# Patient Record
Sex: FEMALE | Race: WHITE
[De-identification: names, ages, dates, MRNs, and addresses within clinical notes are randomized per-mention and may not be internally consistent; named-entity substitution may affect disease eponyms.]

---

## 2020-04-16 NOTE — HP
CHIEF COMPLAINT:  Shortness of breath.



HISTORY OF PRESENT ILLNESS:  This patient is a 59-year-old female, who presented to

the emergency department.  The patient has a history of smoking a pack cigarettes

per day.  Says she had a heart attack about a year ago or so and was treated

primarily at [facility].  She was sent home with 4 or 5 medications and an

inhaler, but essentially ran out of money and did not continue on those medications.

 The patient was in her usual state of health until today when she woke feeling

fine, but at some point in the afternoon developed onset of shortness of breath that

became more severe to the point that she had to call an ambulance after several

hours.  On arrival, O2 saturation was 84%.  She was in respiratory distress with

tripoding.  She was given magnesium and Solu-Medrol and started on BiPAP and

transported to the hospital.  The patient denies any fevers or chills.  She denies

any chest pains.  Denies any ill contacts.  Currently, she states she is actually

feeling somewhat better than she did on her initial arrival. 



REVIEW OF SYSTEMS:  All other systems reviewed.  All were negative, except for those

things mentioned in the history of present illness.  Specifically, she denies fever.

 She denies any orthopnea; although, very difficult to get a straight answer on that

as the patient frequently does sit sitting up because she is more comfortable and

when she is lying, she is on her side rather than on her back, but it does not sound

like she has significant orthopnea. 



PAST MEDICAL HISTORY:  She does report having had a heart attack, treated at [facility] about a year ago.  Says she was admitted to this hospital about 3 years

ago, but has no record of that.  She says she has never been admitted for her

breathing per se.  She does report some obesity. 



PAST SURGICAL HISTORY:  None.



FAMILY HISTORY:  There is pervasive obesity within the family.  Father had a brain

tumor. 



SOCIAL HISTORY:  The patient smokes a pack to a pack and half a day and has done so

for 40 years.  She was a heavy alcohol drinker until about 3 months ago.  At that

time, her son moved in with her and he has been monitoring her intakes and now

reportedly has occasional whiskey with soft drinks.  She denies drugs.  She is not

.  She is a full code and her son would be her surrogate decision maker. 



ALLERGIES:  NONE.



MEDICATIONS:  None.



PHYSICAL EXAMINATION:

VITAL SIGNS:  Blood pressure is 130/90, pulse 96, respirations 22, O2 saturation 99%

on BiPAP.  Initial vital signs revealed a blood pressure of 141/13 with a pulse of

126. 

GENERAL APPEARANCE:  Age-appropriate female, in no distress.  She is obese.  She is

sitting up in the bed in the emergency department wearing the BiPAP.  She is

breathing relatively comfortably.  Speaking in largely full sentences. 

HEENT:  ZEINAB.  She has no OP lesions.  Dry oral mucosa. 

NECK:  Supple and symmetric. 

HEART:  Her heart is faintly heard over the sound of the apparatus, but no murmurs,

gallops, or rubs are noted. 

LUNGS:  Notable for scattered rales.  No wheezing.  Generally diminished. 

ABDOMEN:  Obese, soft, nontender, and nondistended.  Positive bowel sounds.  No

masses.  No organomegaly. 

EXTREMITIES:  Reveal chronic stasis dermatitis with 1 to 2+ pitting brawny edema.

Pulses were not palpable. 

PSYCH:  Normal affect and behavior. 

NEURO:  Cranial nerves intact.  Cognition intact.  No evidence of any focal deficits.



LABORATORY DATA:  White count is 22.1, hemoglobin 15.7, platelets 319.  PH 7.17,

pCO2 65.8, and pO2 is 100.3.  Sodium 141, potassium 3.6, chloride 25, CO2 23, BUN

15, creatinine 0.99, glucose 203, lactic acid 3.2, calcium 9.5, magnesium 2.8, total

bilirubin 0.3, AST 44, ALT 27.  Troponin is 0.211.  .6.  Chest x-ray shows

evidence of cardiomegaly and pulmonary edema.  EKG shows some rightward axis

deviation with no ST or T-wave changes. 



IMPRESSION AND PLAN:  

1. Acute respiratory failure with hypoxia and hypercapnia.  The patient will be

maintained on respiratory support, admitted to [facility].  Pulmonary consult. 

2. Congestive heart failure.  The patient has evidence of significant peripheral

edema and pulmonary edema on chest x-ray with substantial cardiomegaly.  The patient

has not been on treatment.  We will continue with diuresis.  Order an

echocardiogram, ACE inhibitor. 

3. Chronic obstructive pulmonary disease exacerbation as manifest by the hypercapnia

and acidosis.  Continue with nebulizer treatments, Solu-Medrol, respiratory support.

 Pulmonary consult and antibiotics with Rocephin. 

4. History of coronary disease per the patient's history.  We will likely need to

obtain records from [facility] in order to determine exactly what the scenario

was with her cardiac condition.  Suspect she was on beta blockers, ACE inhibitors,

aspirin, statin, and Lasix, although cannot confirm that.  We will keep her on

aspirin for now. 

5. Leukocytosis secondary to demargination from Solu-Medrol given in the field.

6. Indeterminate troponin, likely non-ST-elevation myocardial infarction type 2

secondary to respiratory failure and heart failure exacerbation.  We will continue

to trend while keeping her on telemetry. 

7. Lactic acidosis.  The patient has a white count, tachycardia and lactic acidosis.

 The white count is due to the steroids.  The tachycardia was due to some

respiratory failure and the lactic acidosis is likely hypoperfusion from hypoxia.

There is currently no evidence of specific infection to suggest sepsis. 

8. Significant peripheral edema likely secondary to congestive heart failure.







Job ID:  [id number]

## 2020-04-16 NOTE — CON
DATE OF CONSULTATION:  



HISTORY OF PRESENT ILLNESS:  Dee Bravo is a 59-year-old female.  She is a

smoker.  She says she has an inhaler at home, but never has to use it. 



She had the sudden onset of shortness of breath, led to emergent transport to 
the

hospital.  She feels like once she started getting oxygen and nebulizer 
treatments

she improved.  She was transported on BiPAP apparently. 



She says she is feeling 100% better.



PAST MEDICAL HISTORY:  Remarkable for coronary artery disease.



SOCIAL HISTORY:  She is a pack and a half a day smoker.  She has been a daily

drinker, just cut back on her drinking. 



FAMILY HISTORY:  Positive for cancer.



REVIEW OF SYSTEMS:  Otherwise negative.  Specifically, she denies fever, chills,

sweats, being around anyone who is sick. 



PHYSICAL EXAMINATION:

VITAL SIGNS:  Heart rate is 90, blood pressure is 167/87, respiratory rate is 20
,

and oximetry is 97. 

HEENT:  Pupils are equal.  Sclerae are anicteric. 

NECK:  Supple.  No lymphadenopathy. 

LUNGS:  Distant and clear. 

HEART:  Regular rhythm.  S1 and S2 are normal. 

ABDOMEN:  Soft and nontender. 

EXTREMITIES:  Without clubbing, cyanosis, or edema.



LABORATORY DATA:  White count 22.1, hemoglobin 15.7, and platelets 319.  Sodium 
141,

potassium 3.6, chloride 105, bicarb 23, BUN 15, and creatinine 0.99. 



IMPRESSION:  Probable chronic obstructive pulmonary disease exacerbation.  Chest

x-ray did not show any masses or infiltrates.  Rapid improvement and rapid 
onset are

concerning for a coronary ischemic event, especially with her history of ongoing

tobacco in spite of having a heart attack 3 years ago.  Chest x-ray is very

underpenetrated.  It is hard to say whether or not there is a component of 
pulmonary

edema.  I would recommend repeating a chest x-ray in the morning.  The history 
of

expulsive onset is more worrisome for coronary event, but a rapid improvement 
with

nebulized treatments of BiPAP argues in the favor of lung problems.  I will be 
happy

to follow the other physicians caring for Ms. Bravo.  An echocardiogram would 
be

helpful.  Surprisingly, her BNP was 138, but that may not have been dramatically

elevated.  This was related to transient coronary ischemia.  She does appear to 
be a

diabetic.  We will follow.  Check a chest x-ray in the morning. 



This is a 50 min consult with greater than 50% of the time spent on  the unit 
for coordination of care.



Job ID:  [id number]



MTDD

## 2020-04-16 NOTE — RAD
Exam: Chest one view



HISTORY:Dyspnea



Comparison: None



FINDINGS:

Cardiac silhouette:Cardiomegaly

Aorta: Unremarkable

Pulmonary vessels: Normal

Costophrenic angles: Clear



LUNGS: Diffuse interstitial and patchy alveolar opacities.

Pneumothorax: None



Osseous abnormalities: None



IMPRESSION: 

1. Possible congestive heart failure.

2. Superimposed interstitial/alveolar edema or infiltrate cannot be excluded. Continued surveillance 
is warranted.



Reported By: Ishmael Hebert 

Electronically Signed:  4/16/2020 7:43 AM

## 2020-04-17 NOTE — RAD
PORTABLE CHEST:

 

DATE: 4/17/2020.

 

PROVIDED CLINICAL HISTORY:

Dyspnea.

 

FINDINGS: 

Comparison 4/16/2020.  Cardiac silhouette remains enlarged.  Cardiac silhouette remains enlarged.  Pr
ominence of the pulmonary vasculature and pulmonary interstitium persists, improved with respect to p
rior.  No focal consolidation, pleural fluid, or pneumothorax apparent.

 

IMPRESSION: 

Cardiomegaly and improved changes of congestive failure.

 

POS: KYM

## 2020-04-17 NOTE — PRG
DATE OF SERVICE:  04/17/2020



SUBJECTIVE:  Andrés Bravo remains stable.  She is on room air.



OBJECTIVE:  VITAL SIGNS:  Blood pressure 156/64, heart rate 66, respiratory rate 20. 

LUNGS:  Unchanged. 

HEART:  Unchanged. 

ABDOMEN:  Unchanged.



LABORATORY DATA:  No new lab today.  Her troponin peaked to 0.38.



IMPRESSION:  Status post sudden onset of respiratory distress.  Chest x-ray is under

penetrated, was suggestive of pulmonary edema.  She has no history of asthma or COPD

exacerbations in the past.  When she had a heart attack years ago, she said she had

shortness of breath __________, but it came on more gradually. 



She probably needs to have a cardiac workup prior to discharge.  Overall, she

appears to be stable.  She has not had any fever to suggest this is an infectious

process. 







Job ID:  [id number]

## 2020-04-17 NOTE — CON
DATE OF CONSULTATION:  



HISTORY OF PRESENT ILLNESS:  The patient is a 59-year-old woman with a history 
of a myocardial infarction, who presented with acute onset of dyspnea.  The 
patient

states that approximately three years ago she was at [facility] 
and had a myocardial infarction.  She did not apparently undergo an invasive 
evaluation.

The patient unfortunately has not been compliant with followup and has not been 
taking any medications.  The patient was in her usual state of health when she

became acutely dyspneic.  She denied having any chest discomfort.  She 
presented to the emergency room markedly short of breath. 



PAST MEDICAL HISTORY:  myocardial infarction.



PAST SURGICAL HISTORY:  Hemorrhoidectomy.



MEDICATIONS:  None.



SOCIAL HISTORY:  Smokes  1-2  packs per day.



ALLERGIES:  NO KNOWN DRUG ALLERGIES.



REVIEW OF SYSTEMS:  Ten-point system is otherwise unremarkable.



FAMILY HISTORY:  Strong family history of coronary artery disease.



PHYSICAL EXAMINATION:

GENERAL:  Obese woman, in no acute distress. 

VITAL SIGNS:  Blood pressure was 174/92. 

NECK:  Showed no jugular venous distention. 

LUNGS:  Clear to auscultation. 

HEART:  Regular rate and rhythm.  Normal S1 and S2 with a 3/6 systolic murmur. 

ABDOMEN:  Nondistended. 

EXTREMITIES:  Showed trace edema. 

VASCULAR:  Radial pulses are 2+.



LABORATORY RESULTS: Sodium is 137, potassium 3.9, chloride 98, bicarbonate 27,

BUN 18, creatinine 0.74, glucose is 162.  Troponin was 0.377.  White blood

cell count is 12.9, hemoglobin 15.3, hematocrit 40.2, and platelets are 282.  
Her

EKG revealed normal sinus rhythm with Q-waves suggestive of a normal sinus 
rhythm,

right bundle-branch block and Q-waves suggestive of previous anteroseptal 
infarct. 



IMPRESSION:  

1. Dyspnea, possibly anginal equivalent.

2. Non-Q-wave myocardial infarction. 

3. History of myocardial infarction.

4. Hypertension.

5. Obesity.

6. Noncompliance.



PLAN:  This patient presents with a non Q-wave myocardial infarction.  At this 
time, we would highly recommend she proceed with a cardiac catheterization to 
evaluate the

extent of her coronary artery disease.  The patient is unclear whether she 
wishes to proceed.  I have explained the life threatening consequences of her 
continuing

noncompliance.  The patient is highly advised to discontinue smoking.  We will 
start the patient on medical therapy and proceed with invasive evaluation if 
the patient

becomes agreeable. 







Job ID:  [id number]



[facility]

## 2020-04-17 NOTE — PDOC.HOSPP
- Subjective


Encounter Date: 04/17/20


Encounter Time: 10:40


Subjective: 





sob is better


no chest pain or palp


is sitting on bed





- Objective


Vital Signs & Weight: 


 Vital Signs (12 hours)











  Temp Pulse Resp BP Pulse Ox


 


 04/17/20 10:19     178/104 H 


 


 04/17/20 10:18   83   178/104 H 


 


 04/17/20 07:46      100


 


 04/17/20 07:22   66  16   99


 


 04/17/20 07:20  98.4 F    


 


 04/17/20 00:09   90  16   98








 Weight











Weight                         240 lb 4 oz











 Most Recent Monitor Data











Heart Rate from ECG            66


 


NIBP                           178/104


 


NIBP BP-Mean                   128


 


Respiration from ECG           24


 


SpO2                           98














I&O: 


 











 04/16/20 04/17/20 04/18/20





 06:59 06:59 06:59


 


Intake Total 240 500 


 


Output Total 1300 4600 


 


Balance -1060 -4100 











Result Diagrams: 


 04/17/20 07:44





 04/17/20 03:17





Hospitalist ROS





- Medication


Medications: 


Active Medications











Generic Name Dose Route Start Last Admin





  Trade Name April  PRN Reason Stop Dose Admin


 


Albuterol/Ipratropium  3 ml  04/16/20 07:00  04/17/20 07:22





  Duoneb  NEB   3 ml





  [id number] LIDA   Administration





     





     





     





     


 


Aspirin  81 mg  04/16/20 09:00  04/16/20 09:02





  Ecotrin  PO   81 mg





  DAILY LIDA   Administration





     





     





     





     


 


Enoxaparin Sodium  40 mg  04/16/20 09:00  04/16/20 09:01





  Lovenox  SC   40 mg





  0900 LIDA   Administration





     





     





     





     


 


Furosemide  40 mg  04/16/20 06:00  04/17/20 05:48





  Lasix  SLOW IVP   40 mg





  0600,1400 LIDA   Administration





     





     





     





     


 


Hydralazine HCl  25 mg  04/16/20 21:00  04/17/20 10:18





  Apresoline  PO   25 mg





  TID LIDA   Administration





     





     





     





     


 


Ceftriaxone Sodium 1 gm/  100 mls @ 200 mls/hr  04/17/20 02:00  04/17/20 01:24





  Sodium Chloride  IVPB   100 mls





  Q24HR LIDA   Administration





     





     





     





     


 


Lisinopril  10 mg  04/16/20 21:00  04/17/20 10:19





  Zestril  PO   10 mg





  BID LIDA   Administration





     





     





     





     


 


Methylprednisolone Sodium Succinate  40 mg  04/16/20 06:00  04/17/20 05:49





  Solu-Medrol  IVP   40 mg





  Q6HR LIDA   Administration





     





     





     





     


 


Metoprolol Tartrate  25 mg  04/16/20 21:00  04/17/20 10:19





  Lopressor  PO   25 mg





  BID LIDA   Administration





     





     





     





     














- Exam


General Appearance: awake alert


Eye: PERRL, anicteric sclera


ENT: no oropharyngeal lesions, moist mucosa


Neck: supple, no JVD


Heart: RRR, no murmur


Respiratory: no wheezes, no rales, rhonchi


Gastrointestinal: soft, non-tender, non-distended, normal bowel sounds


Extremities: no cyanosis, 1+ LE edema


Neurological: cranial nerve grossly intact, no focal deficits


Psychiatric: normal affect, A&O x 3





Hosp A/P


(1) Acute exacerbation of CHF (congestive heart failure)


Code(s): I50.9 - HEART FAILURE, UNSPECIFIED   Status: Acute   


Qualifiers: 


   Heart failure type: combined systolic and diastolic   Qualified Code(s): 

I50.43 - Acute on chronic combined systolic (congestive) and diastolic (

congestive) heart failure   





(2) Type 2 MI (myocardial infarction)


Code(s): I21.A1 - MYOCARDIAL INFARCTION TYPE 2   Status: Acute   





(3) COPD exacerbation


Code(s): J44.1 - CHRONIC OBSTRUCTIVE PULMONARY DISEASE W (ACUTE) EXACERBATION   

Status: Acute   





(4) Tobacco abuse


Code(s): Z72.0 - TOBACCO USE   Status: Chronic   





(5) Alcohol use


Code(s): Z72.89 - OTHER PROBLEMS RELATED TO LIFESTYLE   Status: Chronic   





(6) Obesity


Code(s): E66.9 - OBESITY, UNSPECIFIED   Status: Chronic   


Qualifiers: 


   Obesity classification: adult class 3 (BMI >= 40)   Body mass index: BMI 45.0

-49.9 





(7) HTN (hypertension)


Code(s): I10 - ESSENTIAL (PRIMARY) HYPERTENSION   Status: Chronic   


Qualifiers: 


   Hypertension type: essential hypertension   Qualified Code(s): I10 - 

Essential (primary) hypertension   





(8) Dyslipidemia


Code(s): E78.5 - HYPERLIPIDEMIA, UNSPECIFIED   Status: Chronic   





- Plan





is on asp, lipitor, lopressor, lisinopril, procardia xl, gentle diuresis.


echo shows ef of 45%, normal wall motion


for likely cath if she agrees


on nebs, steroids, is off bipap


is ambulating in room


hemostable


may tx to telemetry

## 2020-04-18 NOTE — PRG
DATE OF SERVICE:  04/18/2020



SUBJECTIVE:  She is sitting up in a chair.  Has no acute complaints.  Says she may

go home tomorrow. 



OBJECTIVE:  VITAL SIGNS:  Temperature is 97.6, pulse 72, respirations 12, O2

saturation 94% on room air, blood pressure 170/81. 

HEENT:  Unremarkable. 

NECK:  No adenopathy or JVD. 

CHEST:  Clear. 

CARDIAC:  S1, S2.  Regular. 

ABDOMEN:  Soft. 

EXTREMITIES:  No edema.



LABORATORY DATA:  White blood cell count 12, hematocrit 47, platelet count 316.

Sodium 137, potassium 4, BUN 23, creatinine 0.8, glucose 159. 



ASSESSMENT:  

1. Non-Q-wave myocardial infarction.

2. Diastolic heart failure.

3. Systolic heart failure.



PLAN:  Cardiac catheterization is planned.  She seems stable from a Pulmonary

standpoint.  I had reviewed the orders.  She has been switched over to oral

antibiotics.  We will check her again on Monday. 







Job ID:  [id number]

## 2020-04-18 NOTE — EKG
Test Reason : 

Blood Pressure : ***/*** mmHG

Vent. Rate : 119 BPM     Atrial Rate : 119 BPM

   P-R Int : 160 ms          QRS Dur : 102 ms

    QT Int : 338 ms       P-R-T Axes : 025 113 047 degrees

   QTc Int : 475 ms

 

Sinus tachycardia

Possible Left atrial enlargement

Right axis deviation

Incomplete right bundle branch block

Anterior infarct , age undetermined

Abnormal ECG

 

Confirmed by DIALLO NIEVES ([id number]),  ECTOR VEGA ([id number]) on 4/18/2020 9:22:06 AM

 

Referred By:             Confirmed By:DIALLO NIEVES

## 2020-04-18 NOTE — PDOC.HOSPP
- Subjective


Encounter Date: 04/18/20


Encounter Time: 09:15


Subjective: 





no chest pain or sob


is off oxygen


wants to eat solid food and nicotine patch


is ambulating in room





- Objective


Vital Signs & Weight: 


 Vital Signs (12 hours)











  Temp Pulse Resp BP Pulse Ox


 


 04/18/20 09:06  97.7 F  69  16  187/84 H  95


 


 04/18/20 06:48   72  12  


 


 04/18/20 03:39  98 F  66  18  145/72 H  93 L


 


 04/18/20 00:46   66  16   97


 


 04/18/20 00:00  97.8 F  66  20  138/63  95








 Weight











Weight                         235 lb











 Most Recent Monitor Data











Heart Rate from ECG            58


 


NIBP                           155/77


 


NIBP BP-Mean                   103


 


Respiration from ECG           26


 


SpO2                           95














I&O: 


 











 04/17/20 04/18/20 04/19/20





 06:59 06:59 06:59


 


Intake Total 500  


 


Output Total 4600  


 


Balance -4100  











Result Diagrams: 


 04/18/20 04:29





 04/18/20 04:29





Hospitalist ROS





- Medication


Medications: 


Active Medications











Generic Name Dose Route Start Last Admin





  Trade Name Freq  PRN Reason Stop Dose Admin


 


Albuterol/Ipratropium  3 ml  04/16/20 07:00  04/18/20 06:48





  Duoneb  NEB   3 ml





  [id number] LIDA   Administration





     





     





     





     


 


Aspirin  81 mg  04/16/20 09:00  04/18/20 09:30





  Ecotrin  PO   81 mg





  DAILY LIDA   Administration





     





     





     





     


 


Atorvastatin Calcium  40 mg  04/17/20 21:00  04/17/20 22:42





  Lipitor  PO   40 mg





  HS LIDA   Administration





     





     





     





     


 


Cefdinir  300 mg  04/18/20 09:00  04/18/20 09:29





  Omnicef  PO   300 mg





  BID LIDA   Administration





     





     





     





     


 


Clopidogrel Bisulfate  75 mg  04/18/20 09:00  04/18/20 09:29





  Plavix  PO   75 mg





  DAILY LIDA   Administration





     





     





     





     


 


Enoxaparin Sodium  40 mg  04/16/20 09:00  04/18/20 09:30





  Lovenox  SC   40 mg





  0900 LIDA   Administration





     





     





     





     


 


Furosemide  40 mg  04/18/20 09:00  04/18/20 09:35





  Lasix  PO   Not Given





  DAILY LIDA   





     





     





     





     


 


Hydralazine HCl  25 mg  04/16/20 21:00  04/18/20 09:29





  Apresoline  PO   25 mg





  TID LIDA   Administration





     





     





     





     


 


Lisinopril  10 mg  04/16/20 21:00  04/18/20 09:30





  Zestril  PO   10 mg





  BID LIDA   Administration





     





     





     





     


 


Metoprolol Tartrate  25 mg  04/16/20 21:00  04/18/20 09:29





  Lopressor  PO   25 mg





  BID LIDA   Administration





     





     





     





     


 


Prednisone  20 mg  04/18/20 08:00  04/18/20 09:29





  Prednisone  PO   20 mg





  QAM-WM LIDA   Administration





     





     





     





     














- Exam


General Appearance: awake alert


Eye: PERRL, anicteric sclera


ENT: no oropharyngeal lesions, moist mucosa


Neck: supple, no JVD


Heart: RRR, no murmur


Respiratory: no wheezes, no rales, rhonchi


Gastrointestinal: soft, non-tender, non-distended, normal bowel sounds


Extremities: no cyanosis, no edema


Neurological: cranial nerve grossly intact, no focal deficits


Psychiatric: normal affect, A&O x 3





Hosp A/P


(1) CAD (coronary artery disease)


Code(s): I25.10 - ATHSCL HEART DISEASE OF NATIVE CORONARY ARTERY W/O ANG PCTRS 

  Status: Acute   


Qualifiers: 


   Coronary Disease-Associated Artery/Lesion type: native artery   Native vs. 

transplanted heart: native heart 





(2) Acute exacerbation of CHF (congestive heart failure)


Code(s): I50.9 - HEART FAILURE, UNSPECIFIED   Status: Acute   


Qualifiers: 


   Heart failure type: combined systolic and diastolic   Qualified Code(s): 

I50.43 - Acute on chronic combined systolic (congestive) and diastolic (

congestive) heart failure   





(3) COPD exacerbation


Code(s): J44.1 - CHRONIC OBSTRUCTIVE PULMONARY DISEASE W (ACUTE) EXACERBATION   

Status: Acute   





(4) Tobacco abuse


Code(s): Z72.0 - TOBACCO USE   Status: Chronic   





(5) Alcohol use


Code(s): Z72.89 - OTHER PROBLEMS RELATED TO LIFESTYLE   Status: Chronic   





(6) Obesity


Code(s): E66.9 - OBESITY, UNSPECIFIED   Status: Chronic   


Qualifiers: 


   Obesity classification: adult class 3 (BMI >= 40)   Body mass index: BMI 45.0

-49.9 





(7) HTN (hypertension)


Code(s): I10 - ESSENTIAL (PRIMARY) HYPERTENSION   Status: Chronic   


Qualifiers: 


   Hypertension type: essential hypertension   Qualified Code(s): I10 - 

Essential (primary) hypertension   





(8) Dyslipidemia


Code(s): E78.5 - HYPERLIPIDEMIA, UNSPECIFIED   Status: Chronic   





- Plan





is on asp, plavix, lipitor, lopressor, lisinopril, procardia xl, oral lasix.


echo shows ef of 45%, normal wall motion


s/p stent to RCA, await cath report.


on nebs, steroids


is ambulating in room


hemostable


dc plan in am if cleared by specialists.

## 2020-04-19 NOTE — DIS
DATE OF ADMISSION:  04/16/2020



DATE OF DISCHARGE:  04/19/2020



DISCHARGE DISPOSITION:  Home.



PRIMARY DISCHARGE DIAGNOSES:  Acute respiratory failure with hypoxia on arrival,

resolved; chronic obstructive pulmonary disease exacerbation, resolved; coronary

artery disease, status post stent placed to RCA; acute congestive heart failure

exacerbation with systolic and diastolic dysfunction; tobacco abuse; obesity;

alcohol abuse; hypertension; dyslipidemia. 



PROCEDURES DONE DURING HOSPITALIZATION:  Chest x-ray done on the day of admission

showed findings suggestive of CHF with pulmonary vascular congestion.  Echo with 2D

Doppler done showed EF of 45% to 50%, moderately dilated left atrium.  There was

diastolic dysfunction.  Cardiac catheterization, the official report is pending at

present.  The patient has had placement of drug-eluting stent to RCA.  H and H 14

and 45, platelet count 274.  Blood gas done on arrival showed a pH of 7.17, pCO2 of

65, pO2 of 100.  Discharge BUN and creatinine are 22 and 0.7.  Troponin I was

indeterminate, peaking up to 0.38.  . 



INPATIENT CONSULT:  Dr. Eng/Setven for Cardiology, Dr. Marques for Pulmonology.



DISCHARGE MEDICATIONS:  

1. Aspirin 81 mg p.o. daily.

2. Plavix 75 mg p.o. daily.

3. Lipitor 40 mg p.o. at bedtime.

4. Omnicef 300 mg p.o. twice daily for another 3 days.  

5. DuoNeb q.6 hourly p.r.n.

6. Lisinopril 20 mg twice daily.

7. Lopressor 25 mg twice daily.

8. Nicotine transdermal patch 21 mg daily for another 15 days.

9. Prednisone 20 mg daily for another 2 more days and to discontinue after that.



ALLERGIES:  NO KNOWN DRUG ALLERGIES.



DISCHARGE PLAN:  The patient to follow up with her primary care physician in 1 week,

Dr. Marques in 3 to 4 weeks, and Dr. Eng in 2 weeks. 



BRIEF COURSE DURING HOSPITALIZATION:  The patient initially got admitted on the 16th

with shortness of breath.  She was initially saturating 84%.  Her clinical exam and

imaging studies were consistent with CHF and COPD exacerbations.  She had known

history of alcohol and tobacco abuse.  Also, the patient had indeterminate troponin.

 She has had consultation with Dr. Eng for Cardiology.  Cardiac catheterization

was done on the 16th and the patient has had stent placed to RCA.  This was a

drug-eluting stent.  Official cardiac catheterization report is pending on the

[facility].  She was optimized on medications.  Prior to discharge, she is on room air

and saturating well.  She is ambulating and eating well.  She is wanting to go home

today.  She was counseled with regard to staying away from tobacco and alcohol

completely.  She was also counseled with regard to weight loss with her BMI being 47

at present.  She is cleared for discharge by Dr. Eng.  Please note, I have seen

and examined the patient on the day of discharge. 







Job ID:  [id number]

## 2020-04-20 NOTE — EKG
Test Reason : 

Blood Pressure : ***/*** mmHG

Vent. Rate : 056 BPM     Atrial Rate : 056 BPM

   P-R Int : 166 ms          QRS Dur : 098 ms

    QT Int : 574 ms       P-R-T Axes : 034 068 125 degrees

   QTc Int : 553 ms

 

Sinus bradycardia

Possible Left atrial enlargement

Incomplete right bundle branch block

Anterior infarct , age undetermined

T wave abnormality, consider lateral ischemia

Prolonged QT

Abnormal ECG

Confirmed by SHIRA GARCIA ([id number]) on 4/20/2020 11:32:54 AM

 

Referred By:  LEXIE           Confirmed By:SHIRA GARCIA

## 2020-04-20 NOTE — EKG
Test Reason : 

Blood Pressure : ***/*** mmHG

Vent. Rate : 080 BPM     Atrial Rate : 080 BPM

   P-R Int : 164 ms          QRS Dur : 096 ms

    QT Int : 496 ms       P-R-T Axes : 057 085 105 degrees

   QTc Int : 572 ms

 

Normal sinus rhythm

RSR' or QR pattern in V1 suggests right ventricular conduction delay

Prolonged QT

Possible Left atrial enlargement

T wave abnormality, consider anterolateral ischemia

Abnormal ECG

Confirmed by SHIRA GARCIA ([id number]) on 4/20/2020 11:26:53 AM

 

Referred By:             Confirmed By:SHIRA GARCIA

## 2020-06-09 NOTE — CON
DATE OF CONSULTATION:  06/09/2020



PRIMARY CARDIOLOGIST:  Jeff Eng MD



REASON FOR REQUEST:  Congestive heart failure.



HISTORY OF PRESENT ILLNESS:  Ms. Bravo states that she became short of breath

yesterday, fairly abruptly, became progressively more short of breath, came to the

emergency room, found to be in congestive heart failure.  She has been admitted for

further therapy.  The patient has had increasing amounts of lower extremity

swelling. 



The patient did not have chest pain or pressure. 



The patient has received intravenous Lasix, received one dose of full-dose Lovenox

and is feeling better now. 



PAST HISTORY:  The patient was here in April.  She underwent cardiac catheterization

by Dr. Eng and was found to have a stenosis in a small diameter right coronary

artery.  Dr. Harris placed a stent.  It was a drug-coated stent.  It was a small

diameter 2.25 mm Synergy.  The patient did well following that up until the current

episode. 



MEDICATIONS:  She says, "I don't really know what my medicines are.  There is not a

lot of them."  She is taking aspirin she said, but otherwise she cannot tell me her

medicines. 



REVIEW OF SYSTEMS:  CONSTITUTIONAL:  Positive for cough.  Negative for fever. 

VISION:  No changes. 

HEARING:  No changes. 

PULMONARY:  Positive for shortness of breath. 

CARDIAC:  No chest pain. 

GASTROINTESTINAL:  No nausea, vomiting, or diarrhea. 

SKIN:  No rashes. 

EXTREMITIES:  Severe edema.



FAMILY HISTORY:  Noncontributory.



PHYSICAL EXAMINATION:

GENERAL:  This is a pleasant patient, resting comfortably. 

VITAL SIGNS:  Her blood pressure is 119/54, pulse 60. 

LUNGS:  Clear anteriorly.  Posteriorly, she has some rales.  She also has rhonchi. 

CARDIAC:  No significant murmur, rub, or gallop.  Heart sounds are somewhat distant. 

ABDOMEN:  Obese and nontender. 

EXTREMITIES:  Warm and dry.  No clubbing or cyanosis.  There is moderate-to-severe

edema.  The extremities are warm.  It looks like there is probably some cellulitis. 



PERTINENT LABORATORY DATA:  The peak troponin of 0.261, probably demand ischemia.

Type 2 non-ST elevation infarction.  .  Chest x-ray looks like pulmonary

vascular congestion.  EKG showed no acute changes. 



ASSESSMENT:  

1. Underlying coronary artery disease.

2. Congestive heart failure, systolic and diastolic mixed, ejection fraction is 45%

to 50% on previous admission. 

3. The patient is unsure about her medicines.

4. Continues to smoke.  She said "half pack cigarettes per day.".



PLAN:  

1. Re-loaded with Plavix as she is not completely able to tell me __________ in case

she is not actually taking it. 

2. Continue aspirin.

3. Continue Lasix.

4. Potassium.

5. Resume ACE inhibitors.

6. Continue statins.

7. She will likely be in the hospital a couple of nights.

8. COVID test is being done as a precaution.

9. Basic metabolic panel scheduled for tomorrow.







Job ID:  [id number]

## 2020-06-09 NOTE — HP
CHIEF COMPLAINT:  Shortness of breath.



HISTORY OF PRESENT ILLNESS:  Ms. Bravo is a 59-year-old female with a past medical

history of congestive heart failure, COPD, myocardial infarction, cigarette smoker

among others, was brought to the emergency room with shortness of breath and cough

that has been going on for the last few days, worse today.  The patient was in

respiratory distress.  As per EMS, patient was hypertensive with systolic 220, given

nitroglycerin paste and sublingual nitroglycerin.  The patient also was placed on

BiPAP.  The patient has chronic leg swelling and wound on the lateral right shin for

about a week.  She has been putting hydrogen peroxide on it.  There has been

purulent material coming out of it.  In the emergency room, patient was placed on

BiPAP and workup showed elevated BNP.  Chest x-ray shows pulmonary vascular

congestion.  The troponin is elevated at 0.15, but it had been high in the past,

during the last admission back in April was 0.3.  The patient was given aspirin and

given one dose of Lovenox.  Also, WBC count was elevated, but the patient denies

fever.  Septic workup, and IV antibiotics were given in the emergency room.  COVID

swab was done in the emergency room.  The patient is being admitted to hospital for

further management. 



PAST MEDICAL HISTORY:  

1. Myocardial infarction.

2. Chronic obstructive pulmonary disease.

3. Congestive heart failure.



PAST SURGICAL HISTORY:  Reviewed and not pertinent.



SOCIAL HISTORY:  Denies alcohol drinking.  She smokes cigarettes one pack per day.



FAMILY HISTORY:  Reviewed and noncontributory.



HOME MEDICATIONS:  Please see home medication reconciliation form for updated

medications. 



ALLERGIES:  NO KNOWN ALLERGIES.



PHYSICAL EXAMINATION:

GENERAL:  The patient is awake, in moderate respiratory distress, orthopnea, on

BiPAP. 

VITAL SIGNS:  Blood pressure 161/64, respiratory rate is 24, temperature 98.6,

oxygen saturation 99% on CPAP. 

HEAD AND NECK:  Normocephalic. 

CHEST:  Coarse bilateral breath sounds, bilateral crackles. 

HEART:  S1, S2.  Regular. 

ABDOMEN:  Obese, soft.  Bowel sounds present. 

NEUROLOGIC:  Awake, alert, moving extremities. 

EXTREMITIES:  Lower, there is 2+ pitting edema in both extremities.  There is an 8 x

4 shallow ulcerative lesion on the lateral right lower extremity with surrounding

erythema. 

GENITOURINARY:  No suprapubic tenderness.  No flank tenderness. 

PSYCH:  Awake and alert.



LABS:  As mentioned above in the history of present illness.  An EKG shows sinus

rhythm with nonspecific ST changes.  Chest x-ray shows pulmonary vascular

congestion. 



ASSESSMENT:  

1. Acute exacerbation of congestive heart failure, diastolic.

2. Elevated troponin? acute coronary syndrome, the patient's troponin back in April

was 0.3. 

3. Cellulitis of the lower extremity.

4. History of myocardial infarction.

5. Chronic obstructive pulmonary disease.

6. Cigarette smoker.



PLAN:  

1. Admit to IMCU.

2. Continue with CPAP/BiPAP therapy.

3. IV diuresis.

4. IV antibiotic.

5. Serial troponins.

6. Consult Cardiology in a.m. for evaluation and further recommendations.

7. Reconcile home medications.

8. DVT prophylaxis as appropriate.

9. Expected length of stay, 2 midnights or more.







Job ID:  [id number]

## 2020-06-09 NOTE — RAD
XR Chest 1 View Portable



HISTORY: Dyspnea



COMPARISON: 4/17/2020



FINDINGS: The heart is enlarged. No lobar consolidation, pneumothoraces, rachna pulmonary edema or lar
ge effusions are seen. There is mild pulmonary vascular congestion.



Reported By: Lamine Anderson 

Electronically Signed:  6/9/2020 12:04 AM

## 2020-06-10 NOTE — PDOC.HOSPP
- Subjective


Encounter Date: 06/10/20


Encounter Time: 16:30


Subjective: 





Patient seen and examined for CHF. No CP. SOB improving. No fever or chills. No 

new complaints. No overnight events





- Objective


Vital Signs & Weight: 


 Vital Signs (12 hours)











  Pulse BP Pulse Ox


 


 06/10/20 09:35  67  138/68 


 


 06/10/20 08:00    98








 Weight











Weight                         248 lb 1.6 oz











 Most Recent Monitor Data











Heart Rate from ECG            59


 


NIBP                           132/73


 


NIBP BP-Mean                   92


 


Respiration from ECG           19


 


SpO2                           95














I&O: 


 











 06/09/20 06/10/20 06/11/20





 06:59 06:59 06:59


 


Intake Total  2030 


 


Output Total  3350 2600


 


Balance  -1320 -2600











Result Diagrams: 


 06/10/20 03:55





 06/10/20 03:55


Radiology Reviewed by me: Yes (CXR - CHF)


EKG Reviewed by me: Yes (Tele SR)





Hospitalist ROS





- Review of Systems


Respiratory: reports: SOB with excertion.  denies: cough, dry, shortness of 

breath, hemoptysis, pleuritic pain, sputum, wheezing, other


Cardiovascular: reports: edema.  denies: chest pain, palpitations, orthopnea, 

paroxysmal noc. dyspnea, light headedness, other





- Medication


Medications: 


Active Medications











Generic Name Dose Route Start Last Admin





  Trade Name Freq  PRN Reason Stop Dose Admin


 


Atorvastatin Calcium  40 mg  06/09/20 21:00  06/09/20 20:09





  Lipitor  PO   40 mg





  HS LIDA   Administration





     





     





     





     


 


Clopidogrel Bisulfate  75 mg  06/10/20 09:00  06/10/20 09:37





  Plavix  PO   75 mg





  DAILY LIDA   Administration





     





     





     





     


 


Enoxaparin Sodium  40 mg  06/09/20 21:00  06/10/20 09:35





  Lovenox  SC   40 mg





  0900,2100 LIDA   Administration





     





     





     





     


 


Famotidine  20 mg  06/09/20 21:00  06/10/20 09:35





  Pepcid  PO   20 mg





  BID LIDA   Administration





     





     





     





     


 


Furosemide  40 mg  06/09/20 06:00  06/10/20 14:31





  Lasix  SLOW IVP   40 mg





  0600,1400 LIDA   Administration





     





     





     





     


 


Hydrocortisone/Aloe  0 gm  06/09/20 21:00  06/10/20 11:10





  Hydrocortisone 1% Cream  TOP   Not Given





  BID LIDA   





     





     





     





     


 


Metoprolol Tartrate  25 mg  06/09/20 21:00  06/10/20 09:36





  Lopressor  PO   25 mg





  BID LIDA   Administration





     





     





     





     


 


Sodium Chloride  10 ml  06/09/20 21:00  06/10/20 09:37





  Flush - Normal Saline  IVF   10 ml





  Q12HR LIDA   Administration





     





     





     





     


 


Sodium Chloride  10 ml  06/09/20 09:57  06/10/20 09:37





  Flush - Normal Saline  IVF   10 ml





  PRN PRN   Administration





  Saline Flush   





     





     





     














- Exam


General Appearance: NAD (at rest)


Heart: RRR, no gallops, no rubs, normal peripheral pulses


Respiratory: no wheezes, no rales, normal chest expansion, rhonchi


Gastrointestinal: soft, non-tender, non-distended, normal bowel sounds


Extremities: no cyanosis, no clubbing


Extremities - other findings: B/L LE edema, no calf tenderness


Neurological: no new deficit


Psychiatric: normal affect, A&O x 3





Hosp A/P





- Plan


DVT proph w/lovenox





Acute on chronic systolic/diastolic HF exacerbation


CAD with recent RCA stent


RLE Cellulitis


Morbid obesity BMI 50.1


COPD


CKD 2


Hyponatremia





PLAN:


Cont IV Lasix


Add fluid restriction


Cont BLANCO


Cont Metoprolol


Cont Lisinopril


AM labs


Daily weights


Change Doxycycline to PO


Add Omnicef


Counselled on CHF


Cont other meds as above

## 2020-06-11 NOTE — PDOC.HOSPP
- Subjective


Encounter Date: 06/11/20


Encounter Time: 08:00


Subjective: 





Pt seen for followup re:  CHF exacerbation.  Feels better today.





- Objective


Vital Signs & Weight: 


 Vital Signs (12 hours)











  Temp Pulse Resp BP BP Pulse Ox


 


 06/11/20 15:41  98.3 F  55 L  14   126/58 L  93 L


 


 06/11/20 11:25  97.8 F  55 L  18   123/58 L  94 L


 


 06/11/20 08:51     140/72  


 


 06/11/20 07:49  97.7 F  61  14   127/61  96








 Weight











Weight                         245 lb 4.8 oz











 Most Recent Monitor Data











Heart Rate from ECG            58


 


NIBP                           137/68


 


NIBP BP-Mean                   91


 


Respiration from ECG           19


 


SpO2                           95














I&O: 


 











 06/10/20 06/11/20 06/12/20





 06:59 06:59 06:59


 


Intake Total 2030  


 


Output Total 3350 2600 


 


Balance -1320 -2600 











Result Diagrams: 


 06/11/20 03:12





 06/11/20 03:12


Additional Labs: 





Labs and MARs reviewed by me





EKG Reviewed by me: Yes (Tele:  NSR)





Hospitalist ROS





- Review of Systems


Respiratory: reports: SOB with excertion.  denies: cough, shortness of breath, 

pleuritic pain, wheezing


Cardiovascular: reports: orthopnea.  denies: chest pain, palpitations, 

paroxysmal noc. dyspnea, edema, light headedness


Gastrointestinal: denies: nausea, vomiting, abdominal pain, diarrhea, 

constipation, melena, hematochezia


Genitourinary: denies: dysuria, frequency, incontinence, hematuria, retention


Skin: denies: rash, lesions, karla, bruising





- Medication


Medications: 


Active Medications











Generic Name Dose Route Start Last Admin





  Trade Name Freq  PRN Reason Stop Dose Admin


 


Aspirin  81 mg  06/11/20 09:00  06/11/20 08:52





  Aspirin Chewable  PO   81 mg





  DAILY LIDA   Administration





     





     





     





     


 


Atorvastatin Calcium  40 mg  06/09/20 21:00  06/10/20 21:16





  Lipitor  PO   40 mg





  HS LIDA   Administration





     





     





     





     


 


Cefdinir  300 mg  06/10/20 21:00  06/11/20 08:51





  Omnicef  PO   300 mg





  BID LIDA   Administration





     





     





     





     


 


Clopidogrel Bisulfate  75 mg  06/10/20 09:00  06/11/20 08:52





  Plavix  PO   75 mg





  DAILY LIDA   Administration





     





     





     





     


 


Doxycycline Hyclate  100 mg  06/10/20 21:00  06/11/20 08:50





  Vibramycin  PO   100 mg





  BID LIDA   Administration





     





     





     





     


 


Enoxaparin Sodium  40 mg  06/09/20 21:00  06/11/20 08:52





  Lovenox  SC   40 mg





  0900,2100 LIDA   Administration





     





     





     





     


 


Famotidine  20 mg  06/09/20 21:00  06/11/20 08:51





  Pepcid  PO   20 mg





  BID LIDA   Administration





     





     





     





     


 


Furosemide  40 mg  06/09/20 06:00  06/11/20 14:01





  Lasix  SLOW IVP   40 mg





  0600,1400 LIDA   Administration





     





     





     





     


 


Hydrocortisone/Aloe  0 gm  06/09/20 21:00  06/11/20 08:59





  Hydrocortisone 1% Cream  TOP   1 applic





  BID LIDA   Administration





     





     





     





     


 


Lisinopril  10 mg  06/10/20 21:00  06/11/20 08:51





  Zestril  PO   10 mg





  BID LIDA   Administration





     





     





     





     


 


Metoprolol Tartrate  25 mg  06/09/20 21:00  06/11/20 08:51





  Lopressor  PO   25 mg





  BID LIDA   Administration





     





     





     





     


 


Saccharomyces Boulardii  250 mg  06/11/20 09:00  06/11/20 08:51





  Florastor  PO   250 mg





  DAILY LIDA   Administration





     





     





     





     


 


Sodium Chloride  10 ml  06/09/20 21:00  06/11/20 09:00





  Flush - Normal Saline  IVF   10 ml





  Q12HR LIDA   Administration





     





     





     





     


 


Sodium Chloride  10 ml  06/09/20 09:57  06/11/20 14:04





  Flush - Normal Saline  IVF   10 ml





  PRN PRN   Administration





  Saline Flush   





     





     





     














- Exam


General Appearance: awake alert


Eye: anicteric sclera


ENT: no oropharyngeal lesions, moist mucosa


Neck: supple, symmetric, no thyromegaly, no lymphadenopathy


Heart: RRR, no gallops, no rubs, normal peripheral pulses


Respiratory: no wheezes, no ronchi, normal chest expansion, rales


Gastrointestinal: soft, non-tender, non-distended, normal bowel sounds


Extremities: 2+ LE edema


Psychiatric: normal affect, normal behavior





Hosp A/P





- Plan





ASSESSMENT:


Acute on chronic systolic/diastolic HF exacerbation NYHA Class 3


CAD


RLE Cellulitis


Morbid obesity 


COPD


CKD 2


Hyponatremia





PLAN:


Cont IV furosemide


Continue fluid restriction


Continue Plavix and aspirin (recent stent)


HTN controlled


Change Doxycycline to PO


Continue Omnicef

## 2020-06-13 NOTE — DIS
DATE OF ADMISSION:  06/09/2020



DATE OF DISCHARGE:  06/12/2020



PRIMARY CARE PROVIDER:  [facility] in [place].



DISCHARGE DIAGNOSES:  

1. Acute-on-chronic combined systolic and diastolic congestive heart failure, [place]

[place] Heart Association class III. 

2. Hyponatremia.

3. Hypertriglyceridemia.

4. COVID-19 ruled out.

5. Right lower extremity cellulitis.



CONDITION OF PATIENT ON THE DAY OF DISCHARGE:  Stable. 



I assessed Ms. Bravo on the day of discharge.  She denies any chest pain or

shortness of breath.  Vital signs are stable.  S1 and S2 are heard, regular.  Lungs

are clear to auscultation bilaterally. 



DISCHARGE MEDICATIONS:  

1. Lopressor 25 mg 2 times a day.

2. Aspirin 81 mg daily.

3. Lipitor 40 mg at bedtime.

4. Omnicef 300 mg 2 times a day for 1 week.

5. Plavix 75 mg daily.

6. Doxycycline 100 mg 2 times a day for 1 week.

7. Lasix 40 mg daily.

8. Lisinopril dose decreased to 10 mg 2 times a day.

9. Florastor 250 mg daily for 1 week.

10. DuoNeb p.r.n.



CONSULTATIONS DURING THIS HOSPITALIZATION:  Cardiology, Dr. Lemus.



POST-ACUTE CARE FOLLOWUP:  With Cardiology, Dr. Eng on July 2, 2020, at 9:15

a.m., and with primary care provider on June 19, 2020, at 1:15 p.m. 



HOSPITAL COURSE:  Ms. Bravo is a pleasant 59-year-old lady who was admitted to [facility] on June 9, 2020, for congestive heart failure

exacerbation.  Please refer to Dr. Guerrier's history and physical note dated

June 9, 2020, for further details.  She was also found to have cellulitis of the

right lower extremity.  She was admitted to [facility] and treated with BiPAP and

intravenous diuretics.  Cardiology Service was consulted.  She improved clinically

with intravenous diuretics and was switched to oral diuretics.  She continued to

improve and is being discharged home in a stable condition. 



Preliminary blood cultures are negative at the time of this dictation.  She is

advised to follow up with primary care provider for final blood culture results. 



ACTIVITY:  No restrictions.



DIET:  Heart healthy and low-sodium.



DISCHARGE DESTINATION:  Home.



TIME SPENT:  Total amount of time spent coordinating this discharge, 32 minutes.







Job ID:  [id number]

## 2020-06-16 NOTE — PQF
GALLO Reis

[id number]                                                             

[id number]                             

                                   

CLINICAL DOCUMENTATION CLARIFICATION FORM:  POST DISCHARGE



Addendum to original discharge summary date:  __________________________________
____



Late entry note date:  _________________________________________________________
__











DATE:06/16/2020                                                    ATTN:Gallo Jimenez







Please exercise your independent, professional judgment in responding to the 
clarification form. 

Clinical indicators are provided on the bottom of this form for your review





Please check appropriate box(s) to clarify if the following diagnosis has been 
ruled in or ruled out:



Sepsis

[  ] Ruled in diagnosis

     [  ] Continue to treat        [  ] Resolved

[x  ] Ruled out diagnosis

[  ] Cannot rule out diagnosis

[  ] Other diagnosis ___________

[  ] Unable to determine





For continuity of documentation, please document condition throughout progress 
notes and discharge summary.  Thank You.



CLINICAL INDICATORS - SIGNS / SYMPTOMS / LABS

ED Notes 06/08 "Sepsis"

HP 06/09 "patient has chronic leg swelling and wound on the lateral right shin"

Vital Signs Temp: 06/09=98.7 06/10=98.4

Vital Signs Pulse: 06/10=67 06/11=61

Vital Signs BP: 06/1161=940/58 06/1214=874/56

Vital Signs Respi: 06/11=18 06/12=16

Collected 06/09 "Blood culture:no growth"

Labs WBC: 06/08=17.6 06/09=11.9

Labs Lactate: 06/08=1.6



RISK FACTORS

Old MI-ED Notes 06/08

COPD-ED Notes 06/08

CHF-ED Notes 06/08

Smoker-ED Notes 06/08

Right leg cellulitis-ED Notes 06/08

CAD-Consult 06/09

Morbid obesity-PN 06/10

CKD stage 2-PN 06/10



TREATMENTS

Sepsis workup initiated-HP 06/09

Rocephin 2gm IV-MAR 06/09

Azithromycin 500mg IV-MAR 06/09

Blood culture-Collected 06/09









(This form is maintained as a part of the permanent medical record)

2015 [employer]  All Rights Reserved

[email or web address]    [phone]
[phone]

                                                              



MTDD

## 2020-06-16 NOTE — PQF
JACKY Reis

[id number]                                                             

[id number]                             

                                   

CLINICAL DOCUMENTATION CLARIFICATION FORM:  POST DISCHARGE



Addendum to original discharge summary date:  __________________________________
____



Late entry note date:  _________________________________________________________
__











DATE: 06/16/2020                                                   ATTN:Jacky Jimenez







Please exercise your independent, professional judgment in responding to the 
clarification form. 

Clinical indicators are provided on the bottom of this form for your review





Please check appropriate box(s) to clarify if the following diagnosis has been 
ruled in or ruled out:



NSTEMI

[  ] Ruled in diagnosis

     [  ] Continue to treat        [  ] Resolved

[ x ] Ruled out diagnosis

[  ] Cannot rule out diagnosis

[  ] Other diagnosis ___________

[  ] Unable to determine





For continuity of documentation, please document condition throughout progress 
notes and discharge summary.  Thank You.



CLINICAL INDICATORS - SIGNS / SYMPTOMS / LABS

ED Notes 06/08 "NSTEMI"

Vital Signs Temp: 06/09=98.7 06/10=98.4

Vital Signs Pulse: 06/10=67 06/11=61

Vital Signs BP: 06/1143=039/58 06/1205=847/56

Vital Signs Respi: 06/11=18 06/12=16

Labs Troponin: 06/08=0.150 06/09=0.261 06/10=0.199

HP 06/09 " nonspecific ST changes"

HP 06/09 " elevated troponin? acute coronary syndrome"



RISK FACTORS

Old MI-ED Notes 06/08

COPD-ED Notes 06/08

CHF-ED Notes 06/08

Smoker-ED Notes 06/08

Right leg cellulitis-ED Notes 06/08

CAD-Consult 06/09

Morbid obesity-PN 06/10

CKD stage 2-PN 06/10



TREATMENTS

EKG-HP 06/09

Aspirin 81mg Oral-MAR 06/09

Plavix 300mg Oral-MAR 06/09









(This form is maintained as a part of the permanent medical record)

2015 [employer].  All Rights Reserved

[email or web address]    [phone]
[phone]

                                                              



 

WILLOW

## 2020-09-15 NOTE — HP
REASON FOR ADMISSION:  Acute onset of shortness of breath/respiratory failure.



HISTORY OF PRESENT ILLNESS:  This is a 60-year-old female patient who was short of

breath.  EMS was called.  Upon their arrival, she appeared to be severely hypoxic.

She was intubated in the field, brought to the emergency room, continued to have

diminished oxygenation.  Her ET tube was exchanged.  Chest x-ray was done.  She was

diagnosed with right-sided pneumothorax.  A chest tube was inserted.  After the

chest tube insertion, she became hypotensive, but responded to IV fluids.  Her pulse

ox initially in the 90s, improved after ET tube exchange.  Currently, she is in the

intensive care unit.  Her pulse ox is 97%.  She appears to be comfortable.  She

opens her eyes and responds appropriately. 



I did review her records and the patient was recently admitted to our hospital in

June for acute on chronic combined systolic and diastolic congestive heart failure.

She required to be in IMCU on BiPAP.  She did receive IV diuresis and was

stabilized.  In April, she was admitted for COPD exacerbation. 



PAST MEDICAL HISTORY:  

1. COPD.

2. Congestive heart failure, combined diastolic and systolic.

3. Morbid obesity.

4. CAD with history of MI.



SOCIAL HISTORY:  From records, she is a smoker.



FAMILY HISTORY:  From records, positive for obesity and brain tumor.



REVIEW OF SYSTEMS:  Unable to obtain.  She is intubated.



PHYSICAL EXAMINATION:

GENERAL:  She is intubated. 

VITAL SIGNS:  Her blood pressure is 125/75, her heart rate is 56, saturating 98%,

and temperature is 98.5. 

HEENT:  Head is nontraumatic and normocephalic.  Pupils equal and reactive.

Extraocular movements are intact.  Nonicteric sclerae.  Well injected conjunctivae.

Oral mucosa normal.  Nasal mucosa normal. 

NECK:  Supple.  No adenopathy.  No murmur.  Thyroid is not palpable.  Trachea is

midline.  No supraclavicular adenopathy. 

HEART:  S1 and S2, regular.  No murmurs.  No gallops.  No friction rubs.  No

displacement of PMI. 

LUNGS:  Diffuse and expiratory wheezing bilaterally. 

ABDOMEN:  Bowel sounds positive.  Nontender abdomen. 

EXTREMITIES:  1+ pitting edema in right lower extremities. 

NEUROLOGIC:  Unable to fully assess.  She is ventilated, but she opens her eyes and

she moves all four extremities. 



LABORATORY DATA:  Blood work shows WBC of 18.4, hemoglobin of 15.1, neutrophil count

83.6%.  INR 1.1.  ABG shows of pH 7.1, pCO2 of 88.1, saturating 87.7%.  Sodium 138,

potassium 4.4, bicarb 23, creatinine of 0.97, glucose 213.  Troponin is 0.129,

previous to that 0.068.  Urinalysis shows 11 to 20 wbc's.  Chest x-ray initially

showed right pneumothorax.  The last chest x-ray showed re-expansion of the lung,

possible infiltrate, possible fluid overload. 



ASSESSMENT AND PLAN:  This is a 60-year-old female patient presenting with acute

onset of shortness of breath, hypoxia secondary to possibly development of right

pneumothorax, complicating factors, possible congestive heart failure exacerbation,

and chronic obstructive pulmonary disease exacerbation in a morbidly obese patient. 



Pulmonary.  The patient is intubated.  We will continue with neb treatments and IV

Solu-Medrol.  She possibly has a pneumonia.  We will continue with IV antibiotics. 



Cardiac.  The patient has abnormal troponin, continue cycling her cardiac enzymes.

She will be on IV Lasix for possible component of congestive heart failure

exacerbation. 



For deep venous thrombosis prophylaxis, she will remain on Lovenox. 



One hour of critical care time was spent to manage the patient.







Job ID:  [id number]

## 2020-09-15 NOTE — CON
DATE OF CONSULTATION:  09/15/2020



TIME SPENT:  45 minutes of critical care time.



REASON FOR CONSULTATION:  Respiratory failure requiring mechanical ventilation, COPD

exacerbation, pneumothorax. 



HISTORY OF PRESENT ILLNESS:  The patient is a 60-year-old female, who presented to

the hospital via EMS intubated. She also had a tension pneumothorax on the right.

My guess is she was probably overly aggressively bagged on the way and developed

pneumothorax. A chest tube was placed. It was not completely put into the chest

cavity as the proximal port was still in the chest wall.  She is on mechanical

ventilation. 



PAST MEDICAL HISTORY:  

1. Chronic obstructive pulmonary disease.

2. Diastolic congestive heart failure.

3. Morbid obesity.

4. Coronary artery disease.

5. Myocardial infarction.



PAST SURGICAL HISTORY:  Unremarkable.



SOCIAL HISTORY:  A pack and a half a day smoker and has drank heavily in the past.



FAMILY MEDICAL HISTORY:  Remarkable for cancer.



REVIEW OF SYSTEMS:  Cannot be obtained as she is currently intubated and sedated.



MEDICATIONS:  Unknown.



PHYSICAL EXAMINATION:

VITAL SIGNS:  Temperature 98.5, pulse 67, blood pressure 135/74, O2 saturation 94%. 

GENERAL: She is a morbidly obese female, standing 5 feet 8 inches, weighing 277

pounds with BMI 42. 

HEENT: Remarkable for intubated patient, cushingoid type appearance in the face. 

NECK:  No adenopathy or JVD. 

LUNGS:  Poor air movement bilaterally. 

CARDIOVASCULAR: S1, S2.  Regular. 

ABDOMEN:  Obese, soft, nontender. 

EXTREMITIES:  Obese.  No cyanosis or edema.



LABORATORY AND DIAGNOSTIC DATA:  Her chest x-ray shows a chest tube, that is not

completely placed into the pleural space.  ABG; pH 7.37, pCO2 of 35, pO2 of 258,

that was on SIMV rate 30, inspiratory pressure set to 1:1 ratio, PEEP of 10, FiO2 of

90%.  White blood cell count 18.4, hematocrit 46.4, and platelet count 256.  Sodium

138, potassium 4.4, chloride 105, CO2 of 23, BUN 12, creatinine 0.9, glucose 213.

Chest x-ray initially showed a large right-sided pneumothorax.  COVID test was

negative. 



ASSESSMENT:  

1. Chronic obstructive pulmonary disease with exacerbation.

2. Acute respiratory failure requiring mechanical ventilation.

3. Iatrogenic right pneumothorax, likely from over aggressively bagging.



PLAN:  

1. I have advanced her chest tube and will recheck her chest x-ray.

2. I switched over to volume ventilation with settings to promote a longer

exhalation time. 

3. Nebulization treatments, steroids.

4. Empiric antibiotics.

5. Enoxaparin for DVT prophylaxis.

6. Protonix for GI prophylaxis.

7. Keep paralyzed today.







Job ID:  [id number]

## 2020-09-15 NOTE — RAD
EXAM: Single view of the chest



HISTORY:   Chest tube placement for pneumothorax



COMPARISON: 9/15/2020 2:23 AM



FINDINGS: Single view of the chest shows an enlarged but stable cardiomediastinal silhouette.  The en
dotracheal tube extends slightly down the right mainstem bronchus. An NG tube is seen in the

stomach. There is been interval placement of a right-sided chest tube with evacuation of the right pn
eumothorax. The chest tube is folded back on itself.  There appears to be a moderate left pleural

effusion. No acute osseous abnormality.



IMPRESSION: 

1. Status post chest tube placement with evacuation of pneumothorax

2. Left pleural effusion

3. Low-lying endotracheal tube was withdrawn on a subsequent chest radiograph.



Reported By: Contreras Fitch 

Electronically Signed:  9/15/2020 7:37 AM

## 2020-09-15 NOTE — PDOC.HOSPP
- Subjective


Encounter Date: 09/15/20


Encounter Time: 10:43


Subjective: 





Ms. Bravo was seen today in follow-up of respiratory failure. She is 

currently supported with the mechanical ventilator.





- Objective


Vital Signs & Weight: 


 Vital Signs (12 hours)











  Temp Pulse Resp Pulse Ox


 


 09/15/20 10:08   60  


 


 09/15/20 08:00    26 H 


 


 09/15/20 07:50   61  


 


 09/15/20 07:44   56 L  30 H  100


 


 09/15/20 07:30   69  


 


 09/15/20 06:21     100


 


 09/15/20 06:00  98.5 F   30 H 


 


 09/15/20 05:21   58 L  








 Weight











Weight                         277 lb 8.992 oz











 Most Recent Monitor Data











Heart Rate from ECG            59


 


NIBP                           107/67


 


NIBP BP-Mean                   80


 


Respiration from ECG           26


 


SpO2                           95














I&O: 


 











 09/14/20 09/15/20 09/16/20





 06:59 06:59 06:59


 


Output Total  95 1150


 


Balance  -95 -1150











Result Diagrams: 


 09/15/20 02:30





 09/15/20 03:13





Hospitalist ROS





- Medication


Medications: 


Active Medications











Generic Name Dose Route Start Last Admin





  Trade Name Freq  PRN Reason Stop Dose Admin


 


Albuterol/Ipratropium  3 ml  09/15/20 07:03  09/15/20 07:44





  Duoneb  NEB   3 ml





  [id number] PRN   Administration





  SOB &/or Wheezing   





     





     





     


 


Albuterol/Ipratropium  3 ml  09/15/20 10:30  09/15/20 10:19





  Duoneb  NEB   3 ml





  [id number] LIDA   Administration





     





     





     





     


 


Aspirin  325 mg  09/15/20 09:00  09/15/20 08:35





  Aspirin  PER TUBE   325 mg





  DAILY LIDA   Administration





     





     





     





     


 


Enoxaparin Sodium  40 mg  09/15/20 09:00  09/15/20 08:35





  Lovenox  SC   40 mg





  0900 LIDA   Administration





     





     





     





     


 


Furosemide  40 mg  09/15/20 09:00  09/15/20 08:35





  Lasix  SLOW IVP   40 mg





  DAILY LIDA   Administration





     





     





     





     


 


Sodium Chloride  1,000 mls @ 70 mls/hr  09/15/20 08:15  09/15/20 08:39





  Normal Saline 0.9%  IV   1,000 mls





  .[id number] LIDA   Administration





     





     





     





     


 


Lorazepam  2 mg  09/15/20 05:39  09/15/20 08:03





  Ativan  SLOW IVP  10/15/20 05:39  2 mg





  Q1H PRN   Administration





  Breakthrough agitation   





     





     





     


 


Methylprednisolone Sodium Succinate  40 mg  09/15/20 09:00  09/15/20 08:35





  Solu-Medrol  IVP   40 mg





  Q6H LIDA   Administration





     





     





     





     


 


Pantoprazole Sodium  40 mg  09/15/20 09:00  09/15/20 08:35





  Protonix  IVP   40 mg





  DAILY LIDA   Administration





     





     





     





     


 


Propofol  1,000 mg  09/15/20 05:39  09/15/20 08:37





  Diprivan  IV  10/15/20 05:39  1,000 mg





  INF PRN   Administration





  TO ACHIEVE GOAL RASS   





     





  Protocol   





     














- Exam


Eye: PERRL, anicteric sclera


Respiratory: rhonchi (+ bilateral rhonchi)


Gastrointestinal: soft, non-tender, non-distended, normal bowel sounds, no 

palpable masses


Extremities: no cyanosis, 2+ LE edema (+ bilateral lower extremity edema)





Hosp A/P


(1) Acute respiratory failure with hypoxia and hypercapnia


Code(s): J96.01 - ACUTE RESPIRATORY FAILURE WITH HYPOXIA; J96.02 - ACUTE 

RESPIRATORY FAILURE WITH HYPERCAPNIA   Status: Acute   





(2) Morbid obesity with BMI of 40.0-44.9, adult


Code(s): E66.01 - MORBID (SEVERE) OBESITY DUE TO EXCESS CALORIES; Z68.41 - BODY 

MASS INDEX (BMI) 40.0-44.9, ADULT   Status: Chronic   





(3) Acute exacerbation of CHF (congestive heart failure)


Code(s): I50.9 - HEART FAILURE, UNSPECIFIED   Status: Acute   


Qualifiers: 


   Heart failure type: combined systolic and diastolic   Qualified Code(s): 

I50.43 - Acute on chronic combined systolic (congestive) and diastolic (

congestive) heart failure   





(4) COPD exacerbation


Code(s): J44.1 - CHRONIC OBSTRUCTIVE PULMONARY DISEASE W (ACUTE) EXACERBATION   

Status: Acute   





(5) HTN (hypertension)


Code(s): I10 - ESSENTIAL (PRIMARY) HYPERTENSION   Status: Chronic   


Qualifiers: 


   Hypertension type: essential hypertension   Qualified Code(s): I10 - 

Essential (primary) hypertension   





- Plan





* Acute respiratory failure due to COPD exacerbation and CHF exacerbation


* Continue vent support


* Continue IV antibiotics and IV steroids, Duonebs


* Continue IV Lasix


* GI and DVT Prophylaxis


* Consider Tube feeds


* Further recommendations from [facility]

## 2020-09-15 NOTE — RAD
CHEST 1 VIEW:

 

HISTORY: 

Post intubation.

 

TIME: 2:23 A.M.

DATE: 9/15/2020.

 

COMPARISON: 

6/8/2020.

 

FINDINGS: 

Very large right-sided pneumothorax with collapse of the right lung and shift of the heart and medias
tinum to the left, evidence for significant tension.  The NG tube tip appears to extend to near the l
evel of the hemidiaphragms.  Endotracheal tube appears to be in the right mainstem bronchus orifice.

 

IMPRESSION: 

Large right-sided tension pneumothorax.

 

 

CHEST 1 VIEW:

 

TIME: 3:02 A.M.

DATE: 9/15/2020.

 

FINDINGS: 

A right chest tube has been placed which extends to near the midline.  It is somewhat kinked upon its
elf and the tip then extends back towards the mid right lung.  There has been considerable improvemen
t in the large tension pneumothorax.  Endotracheal tube tip appears to be in the right mainstem bronc
hus.  NG tube in adequate position.  Patchy pleural and parenchymal changes in the left chest with so
me patchy parenchymal changes in the left mid lung zone laterally and blunting of the left costophren
ic angle.

 

IMPRESSION: 

Chest tube placed on the right with marked improvement in the right-sided pneumothorax.  The chest tu
be is kinked upon itself and repositioning would be advisable.  Endotracheal tube is in the orifice o
f the right mainstem bronchus.

 

POS: OFF

## 2020-09-15 NOTE — RAD
Exam: Chest one view



HISTORY:Cyanotic and unresponsive on seen.



Comparison: 9/15/2020 at 3:10 AM



FINDINGS:

Lines and tubes: Endotracheal tube, nasogastric tube, right-sided chest tube are redemonstrated. Righ
t-sided chest tube has been pulled back. Sidehole is now in the right chest wall. Interval

placement of a left-sided subclavian vascular catheter. Distal tip appears to terminate in the right 
atrium. Cardiac silhouette:Cardiomegaly.

Aorta: Unremarkable

Pulmonary vessels: Normal

Costophrenic angles: Clear



LUNGS: Stable scattered interstitial and alveolar opacities predominantly in the left lung.

Pneumothorax: None



Osseous abnormalities: None



IMPRESSION: No

1. Lines and tubes as above. Consider advancement of right-sided chest tube given that the sidehole i
s in the right chest wall.

2. Persistent opacification of the lung parenchyma.

3. Results of study conveyed to patient's nurse Autumn 9/15/2020 at 7:29 AM

Code CR



Reported By: Ishmael Hebert 

Electronically Signed:  9/15/2020 7:36 AM

## 2020-09-15 NOTE — RAD
CHEST 1 VIEW:

 

HISTORY: 

Repositioning chest tubes.

 

FINDINGS: 

NG tube, endotracheal tube, and right subclavian catheters are in place.  The right chest tube appear
s to be advanced somewhat from the prior study and is slightly bent on itself distally.  Small residu
al right-sided apical pneumothorax.  Patchy parenchymal changes in the right lung as well as some min
imal confluent parenchymal changes in the left lung with some left pleural effusion.

 

IMPRESSION: 

Small residual right-sided pneumothorax.  The right chest tube has been advanced somewhat and is slig
htly bent upon itself.  Nasogastric tube, endotracheal tube, and right subclavian catheters are in pl
ace.  Minimal pleural and parenchymal opacity changes in the left chest.  Continue short-term followu
p.

 

POS: OFF

## 2020-09-16 NOTE — RAD
EXAM: 

CHEST ONE VIEW



HISTORY:

Pneumonia. Right-sided chest U.



COMPARISON:

9/15/2020



FINDINGS:

Endotracheal tube, nasogastric tube, and right-sided vascular catheter remain in place. Cardiac silho
uette remains enlarged. Right-sided thoracostomy tube is again seen with tip now overlying right

lung base. Right-sided pneumothorax is again seen with the pneumothorax has increased in size compare
d to prior study with pneumothorax seen at the right lung apex as well as at the right lung base.

Increased density is seen in the visualized right lung likely related to volume loss. There is nodula
r appearing opacity in the right hilar region which could be related to volume loss and

accentuation of vascular structures in this region. There is improved aeration in the left upper lung
 zone. Subcutaneous emphysema is again seen about the right chest which has increased.



IMPRESSION:



1. Right-sided thoracostomy tube again noted in place which does appear to have been advanced with ti
p now overlying the right lung base. Right-sided pneumothorax persists and is larger in size on

current exam.

2. Remaining lines and tubes are stable in position.

3. Volume loss involving the right lung with nodular appearing opacity in the right hilar region. Thi
s nodular opacity may represent volume loss as well. However, continued follow-up is recommended..

4. Improved aeration left upper lung. 

5. Above findings discussed with Анна CCU nurse on 9/16/2020 at 0759 hours.



Reported By: Abe Patel 

Electronically Signed:  9/16/2020 8:01 AM

## 2020-09-16 NOTE — PRG
DATE OF SERVICE:  09/16/2020



This is 35 minutes critical care time.



SUBJECTIVE:  The patient remains intubated on mechanical ventilation.  There has

been no acute changes overnight.  She has not required paralysis. 



OBJECTIVE:  VITAL SIGNS:  Her temperature is 99.0, pulse 91, blood pressure 135/61.

She is currently on SIMV, rate 22, tidal volume of 350, PEEP 5, pressure support 10,

FiO2 60%. 

HEENT:  Unremarkable. 

NECK:  No adenopathy 

or JVD. 

LUNGS:  Diffuse wheezing bilaterally. 

CARDIAC:  S1, S2 regular. ABDOMEN:  Midline abdominal hernia noted. 

EXTREMITIES:  No edema.



LABORATORY DATA:  Sodium 137, potassium 4.3, chloride 104, CO2 of 26, BUN 15,

creatinine 0.7, glucose 161.  White count 4.3, hemoglobin 13, hematocrit 40, and

platelet count 230. 



ASSESSMENT:  

1. Chronic obstructive pulmonary disease with exacerbation.

2. Acute respiratory failure requiring mechanical ventilation.



PLAN:  

1. Not weanable at this time.  Await ABG from today and make adjustments based on

that. 

2. Continue steroids, antibiotics, and nebulization treatments.

3. Discontinue Levophed order.







Job ID:  [id number]

## 2020-09-16 NOTE — PDOC.HOSPP
- Subjective


Encounter Date: 09/16/20


Encounter Time: 09:45


Subjective: 


Ms. Bravo was seen today in follow-up of respiratory failure. She is 

intubated. She will respond to commands. 





- Objective


Vital Signs & Weight: 


                             Vital Signs (12 hours)











  Temp Pulse Resp BP


 


 09/16/20 02:02   62   122/47 L


 


 09/16/20 02:00    22 H 


 


 09/16/20 00:01   63   119/54 L


 


 09/16/20 00:00  99.4 F   22 H 


 


 09/15/20 22:00    22 H 








                                     Weight











Admit Weight                   277 lb


 


Weight                         277 lb 8.992 oz











                            Most Recent Monitor Data











Heart Rate from ECG            65


 


NIBP                           122/47


 


NIBP BP-Mean                   72


 


Respiration from ECG           12


 


SpO2                           92














I&O: 


                                        











 09/15/20 09/16/20 09/17/20





 06:59 06:59 06:59


 


Intake Total  2407 


 


Output Total 95 2365 


 


Balance -95 42 











Result Diagrams: 


                                 09/15/20 02:30





                                 09/16/20 03:30


Additional Labs: 


                                   Accuchecks











  09/15/20





  15:37


 


POC Glucose  154 H














Hospitalist ROS





- Medication


Medications: 


Active Medications











Generic Name Dose Route Start Last Admin





  Trade Name Freq  PRN Reason Stop Dose Admin


 


Albuterol/Ipratropium  3 ml  09/15/20 07:03  09/15/20 12:44





  Duoneb  NEB   3 ml





  [id number] PRN   Administration





  SOB &/or Wheezing  


 


Albuterol/Ipratropium  3 ml  09/15/20 13:00  09/16/20 02:02





  Duoneb  NEB   3 ml





  [id number] LIDA   Administration


 


Aspirin  325 mg  09/15/20 09:00  09/16/20 09:27





  Aspirin  PER TUBE   325 mg





  DAILY LIDA   Administration


 


Enoxaparin Sodium  40 mg  09/15/20 09:00  09/16/20 09:28





  Lovenox  SC   40 mg





  0900 LIDA   Administration


 


Furosemide  40 mg  09/15/20 09:00  09/16/20 09:28





  Lasix  SLOW IVP   40 mg





  DAILY LIDA   Administration


 


Fentanyl Citrate 2,000 mcg/  100 mls @ 0 mls/hr  09/15/20 05:39  09/16/20 02:19





  Sodium Chloride  IV  10/15/20 05:39  100 mls





  INF LIDA   Administration





  Protocol  





  Per Protocol  


 


Azithromycin 500 mg/ Sodium  250 mls @ 250 mls/hr  09/16/20 09:00  09/16/20 

09:28





  Chloride  IVPB   250 mls





  Q24HR LIDA   Administration


 


Sodium Chloride  1,000 mls @ 70 mls/hr  09/15/20 08:15  09/16/20 00:19





  Normal Saline 0.9%  IV   1,000 mls





  .[id number] LIDA   Administration


 


Lorazepam  2 mg  09/15/20 05:39  09/15/20 15:15





  Ativan  SLOW IVP  10/15/20 05:39  2 mg





  Q1H PRN   Administration





  Breakthrough agitation  


 


Methylprednisolone Sodium Succinate  40 mg  09/15/20 09:00  09/16/20 02:09





  Solu-Medrol  IVP   40 mg





  Q6H LIDA   Administration


 


Pantoprazole Sodium  40 mg  09/15/20 09:00  09/15/20 08:35





  Protonix  IVP   40 mg





  DAILY LIDA   Administration


 


Propofol  1,000 mg  09/15/20 05:39  09/16/20 09:25





  Diprivan  IV  10/15/20 05:39  1,000 mg





  INF PRN   Administration





  TO ACHIEVE GOAL RASS  





  Protocol  


 


Vecuronium Bromide  10 mg  09/15/20 07:42  09/15/20 17:08





  Norcuron  IVP   10 mg





  Q30MIN PRN   Administration





  Agitation  














- Exam


Eye: PERRL, anicteric sclera


Heart: RRR, no murmur, no gallops, no rubs, normal peripheral pulses


Respiratory: rales


Gastrointestinal: soft, non-tender, non-distended, normal bowel sounds, no 

palpable masses, no hepatomegaly, no splenomegaly


Extremities: no cyanosis, 1+ LE edema





Hosp A/P


(1) Acute respiratory failure with hypoxia and hypercapnia


Code(s): J96.01 - ACUTE RESPIRATORY FAILURE WITH HYPOXIA; J96.02 - ACUTE 

RESPIRATORY FAILURE WITH HYPERCAPNIA   Status: Acute   





(2) Morbid obesity with BMI of 40.0-44.9, adult


Code(s): E66.01 - MORBID (SEVERE) OBESITY DUE TO EXCESS CALORIES; Z68.41 - BODY 

MASS INDEX (BMI) 40.0-44.9, ADULT   Status: Chronic   





(3) Acute exacerbation of CHF (congestive heart failure)


Code(s): I50.9 - HEART FAILURE, UNSPECIFIED   Status: Acute   


Qualifiers: 


   Heart failure type: combined systolic and diastolic   Qualified Code(s): 

I50.43 - Acute on chronic combined systolic (congestive) and diastolic 

(congestive) heart failure   





(4) COPD exacerbation


Code(s): J44.1 - CHRONIC OBSTRUCTIVE PULMONARY DISEASE W (ACUTE) EXACERBATION   

Status: Acute   





(5) HTN (hypertension)


Code(s): I10 - ESSENTIAL (PRIMARY) HYPERTENSION   Status: Chronic   


Qualifiers: 


   Hypertension type: essential hypertension   Qualified Code(s): I10 - 

Essential (primary) hypertension   





- Plan





* Acute respiratory failure due to COPD exacerbation and CHF exacerbation


* Continue vent support


* Continue Rocephin, Azithromycin and Solumedrol IV


* Continue IV Lasix


* Blood glucose is stable


* GI and DVT Prophylaxis


* Start tube feeds


* Further recommendations from PCCM

## 2020-09-17 NOTE — PRG
DATE OF SERVICE:  09/17/2020



TIME SPENT:  35 minutes of critical care time.



SUBJECTIVE:  The patient remains intubated on mechanical ventilation.  She will wake

up, follow commands without constraint. 



OBJECTIVE:  VITAL SIGNS:  Temperature is 98.4, pulse 66, blood pressure 139/58, O2

saturation 95%.  Total intake for 24 hours 3046, output 1762. 

HEENT:  Unremarkable. 

NECK:  No JVD. 

LUNGS:  Some mild expiratory wheezing.  Plateau pressure of 17, peak pressure of 37.

 CARDIAC:  S1 and S2.  Regular. 

ABDOMEN:  Soft and nontender. 

EXTREMITIES:  No edema.



LABORATORY DATA:  Sodium 137, potassium 4.3, chloride 104, CO2 of 26, BUN 15,

creatinine 0.7, glucose 161; pH 7.35, pCO2 of 48, pO2 of 117.  Chest x-ray shows a

right apical pneumothorax.  Chest tube did not show any leak today. 



ASSESSMENT:  

1. Right-sided pneumothorax, probably from aggressive bagging during CPR.

2. Acute respiratory failure requiring mechanical ventilation.

3. Chronic obstructive pulmonary disease with exacerbation.



PLAN:  

1. Begin to decrease ventilatory support as tolerated.  Still has some bronchospasm,

so I will assume it is going to be 1 to 2 more days on mechanical ventilation. 

2. Can stop the azithromycin.

3. Stop IV fluids.

4. Order labs for tomorrow.





Job ID:  [id number]

## 2020-09-17 NOTE — RAD
PORTABLE CHEST:

 

Date:  09/17/2020

 

PROVIDED CLINICAL HISTORY:   

Pneumonia. 

 

FINDINGS:

 

Comparison with 09/16/2020.  

 

Support apparatus appears stable in position. There is a stable right pneumothorax. There is no shift
 of mediastinal contents. The left lung base is poorly evaluated on the basis of cardiomegaly with ba
silar pleural and/or parenchymal opacity possible. The appearance of the lung parenchyma is overall n
ot significantly changed with respect to prior. Cardiac silhouette remains enlarged. 

 

IMPRESSION: 

Stable radiographic appearance of the chest. 

 

 

POS: OFF

## 2020-09-18 NOTE — PRG
DATE OF SERVICE:  09/18/2020



A 35-minutes critical care time.



SUBJECTIVE:  The patient remains intubated on mechanical ventilation.  There have

been no acute changes overnight. 



OBJECTIVE:  VITAL SIGNS:  Temperature 98.7, pulse 68, blood pressure 168/67, and sat

94%. 

HEENT:  Unremarkable. 

NECK:  No adenopathy or JVD. 

LUNGS:  No wheezing. 

CARDIAC:  S1 and S2, regular. 

ABDOMEN:  Soft. 

EXTREMITIES:  No edema. 



Her chest x-ray showed no pneumothorax.  Her chest tube showed no air leak.



LABORATORY DATA:  White blood cell count 8.6, hematocrit 42, and platelet count 214.

 PH 7.32, pCO2 of 63, and PO2 of 95.  Sodium 140, potassium 4, chloride 102, CO2 of

30, BUN 26, creatinine 0.6, and glucose 223. 



ASSESSMENT:  

1. Acute respiratory failure requiring mechanical ventilation.

2. Right pneumothorax-iatrogenic from over aggressive bagging.

3. Chronic obstructive pulmonary disease exacerbation.



PLAN:  Spontaneous breathing trial and consider extubating if she passes.  Continue

steroids, nebulization treatments, but change the nebs to q.4 hours. 







Job ID:  [id number]

## 2020-09-18 NOTE — RAD
PORTABLE CHEST:

 

HISTORY: 

Pneumonia followup.

 

COMPARISON: 

9/17/2020.

 

FINDINGS: 

ET tube and NG tube are in place.  There are bibasilar infiltrates and probable small effusions.  Haz
y right upper lung infiltrates in the perihilar regions.  No evidence of significant change from yest
erday.

 

The right chest tube is again seen.  No significant pneumothorax identified.  Subcutaneous emphysema 
again noted.

 

IMPRESSION: 

No evidence of significant interval change.

 

POS: OFF

## 2020-09-18 NOTE — PDOC.HOSPP
- Subjective


Encounter Date: 09/18/20


Encounter Time: 11:00


Subjective: 


Patient seen for follow-up regarding acute hypoxic respiratory failure.  Patient

is intubated, unable to obtain ROS.





- Objective


Vital Signs & Weight: 


                             Vital Signs (12 hours)











  Temp Pulse Resp Pulse Ox


 


 09/18/20 10:34   81  14  94 L


 


 09/18/20 09:20   79  20  94 L


 


 09/18/20 08:00  98.6 F   6 L  96


 


 09/18/20 07:05   85  


 


 09/18/20 06:00    18 


 


 09/18/20 05:04   68  18  97


 


 09/18/20 04:00  98.7 F   18 


 


 09/18/20 02:59   63  18  95


 


 09/18/20 02:37   68  








                                     Weight











Admit Weight                   277 lb


 


Weight                         271 lb 13.279 oz











                            Most Recent Monitor Data











Heart Rate from ECG            68


 


NIBP                           187/86


 


NIBP BP-Mean                   119


 


Respiration from ECG           24


 


SpO2                           94














I&O: 


                                        











 09/17/20 09/18/20 09/19/20





 06:59 06:59 06:59


 


Intake Total 3046.7 4174 214.4


 


Output Total 1762 3125 315


 


Balance 1284.7 1049 -100.6











Result Diagrams: 


                                 09/18/20 03:25





                                 09/18/20 03:25


Additional Labs: 


I reviewed patient's labs and MAR


EKG Reviewed by me: Yes (Telemetry: NSR)





Hospitalist ROS





- Review of Systems


ROS unobtainable: due to endotracheal tube





- Medication


Medications: 


Active Medications











Generic Name Dose Route Start Last Admin





  Trade Name Freq  PRN Reason Stop Dose Admin


 


Albuterol/Ipratropium  3 ml  09/15/20 07:03  09/15/20 12:44





  Duoneb  NEB   3 ml





  [id number] PRN   Administration





  SOB &/or Wheezing  


 


Albuterol/Ipratropium  3 ml  09/18/20 10:30  09/18/20 10:34





  Ipratropium/Albuterol Sulfate 3 Ml Neb  NEB   3 ml





  [id number] LIDA   Administration


 


Aspirin  325 mg  09/15/20 09:00  09/18/20 08:57





  Aspirin  PER TUBE   325 mg





  DAILY LIDA   Administration


 


Enoxaparin Sodium  40 mg  09/15/20 09:00  09/18/20 08:57





  Lovenox  SC   40 mg





  0900 LIDA   Administration


 


Fentanyl Citrate 2,000 mcg/  100 mls @ 0 mls/hr  09/15/20 05:39  09/18/20 06:34





  Sodium Chloride  IV  10/15/20 05:39  100 mls





  INF LIDA   Administration





  Protocol  





  Per Protocol  


 


Ceftriaxone Sodium 1 gm/  100 mls @ 200 mls/hr  09/16/20 09:00  09/18/20 09:01





  Sodium Chloride  IVPB   100 mls





  Q24HR LIDA   Administration


 


Lorazepam  2 mg  09/15/20 05:39  09/16/20 09:49





  Ativan  SLOW IVP  10/15/20 05:39  2 mg





  Q1H PRN   Administration





  Breakthrough agitation  


 


Methylprednisolone Sodium Succinate  40 mg  09/15/20 09:00  09/18/20 09:00





  Solu-Medrol  IVP   40 mg





  Q6H LIDA   Administration


 


Montelukast Sodium  10 mg  09/17/20 09:00  09/18/20 08:57





  Montelukast Sodium 10 Mg Tablet  PER TUBE   10 mg





  DAILY LIDA   Administration


 


Pantoprazole Sodium  40 mg  09/18/20 09:00  09/18/20 08:57





  Pantoprazole 40 Mg Granules Packet  PER TUBE   40 mg





  DAILY LIDA   Administration


 


Propofol  1,000 mg  09/15/20 05:39  09/18/20 05:11





  Diprivan  IV  10/15/20 05:39  1,000 mg





  INF PRN   Administration





  TO ACHIEVE GOAL RASS  





  Protocol  














- Exam


General - other findings: Morbid obesity


Eye: anicteric sclera


ENT: moist mucosa


Neck: supple


Heart: RRR


Respiratory: CTAB


Gastrointestinal: soft


Skin: no rashes


Psychiatric - other findings: Unable to assess





Hosp A/P





- Plan





(1) Acute respiratory failure with hypoxia and hypercapnia


Code(s): J96.01 - ACUTE RESPIRATORY FAILURE WITH HYPOXIA; J96.02 - ACUTE 

RESPIRATORY FAILURE WITH HYPERCAPNIA   Status: Acute   





(2) COPD exacerbation


Code(s): J44.1 - CHRONIC OBSTRUCTIVE PULMONARY DISEASE W (ACUTE) EXACERBATION   

Status: Acute   





(3) Pneumothorax


Code(s): I50.9 - HEART FAILURE, UNSPECIFIED   Status: Acute   


Qualifiers: 


   Status: acute





(4) Morbid obesity with BMI of 40.0-44.9, adult


Code(s): E66.01 - MORBID (SEVERE) OBESITY DUE TO EXCESS CALORIES; Z68.41 - BODY 

MASS INDEX (BMI) 40.0-44.9, ADULT   Status: Chronic   








(5) HTN (hypertension)


Code(s): I10 - ESSENTIAL (PRIMARY) HYPERTENSION   Status: Chronic   


Qualifiers: 


   Hypertension type: essential hypertension   Qualified Code(s): I10 - 

Essential (primary) hypertension   





- Plan





* Patient is still intubated, mechanically ventilated in CCU.


* Continue Rocephin, Azithromycin and Solumedrol IV


* Lasix has been discontinued


* Propofol for sedation


* GI and DVT Prophylaxis


* Patient is on tube feeds


* Iatrogenic pneumothorax improving

## 2020-09-19 NOTE — EKG
Test Reason : 

Blood Pressure : ***/*** mmHG

Vent. Rate : 064 BPM     Atrial Rate : 064 BPM

   P-R Int : 168 ms          QRS Dur : 102 ms

    QT Int : 488 ms       P-R-T Axes : 052 100 052 degrees

   QTc Int : 503 ms

 

*** Poor data quality, interpretation may be adversely affected

Normal sinus rhythm

Rightward axis

Possible Anterior infarct , age undetermined

Prolonged QT

Abnormal ECG

 

Confirmed by DIALLO NIEVES ([id number]),  ECTOR VEGA ([id number]) on 9/19/2020 2:06:00 PM

 

Referred By:             Confirmed By:DIALLO NIEVES

## 2020-09-19 NOTE — RAD
EXAM:

CHEST ONE VIEW:

9/19/20

 

HISTORY: 

Pneumonia. 

 

COMPARISON: 

9/18/20.

 

FINDINGS: 

The previously noted right chest tube has been removed without significant pneumothorax. There is edmond
e persistent subcutaneous emphysema. Poor inspiratory effort with increased bronchovascular markings 
bilaterally more so in the bases with probable small pleural effusions and cardiomegaly. The congesti
on and pleural effusions appear more marked than on the most recent 9/18/20 study. 

 

IMPRESSION: 

Progressive vascular congestion with minimal interstitial and linear worsening opacities in the perih
ilar regions and small bilateral pleural effusions with cardiomegaly. The NG tube and endotracheal tu
bes have been removed. Slightly less inspiration which may account for the somewhat more prominent in
creased markings. 

 

POS: OFF

## 2020-09-19 NOTE — PDOC.HOSPP
- Subjective


Encounter Date: 09/19/20


Encounter Time: 13:19


Subjective: 


Patient seen for follow-up regarding respiratory failure.  She was extubated 

earlier today.  Sitting in bed, reports feeling better.





- Objective


Vital Signs & Weight: 


                             Vital Signs (12 hours)











  Temp Pulse Resp BP Pulse Ox


 


 09/19/20 10:29   80  16  


 


 09/19/20 10:25   68   208/79 H 


 


 09/19/20 07:19   81  14  


 


 09/19/20 06:54   70   203/93 H 


 


 09/19/20 04:00  98.3 F    


 


 09/19/20 02:22   66  16   96








                                     Weight











Admit Weight                   277 lb


 


Weight                         259 lb 7.745 oz











                            Most Recent Monitor Data











Heart Rate from ECG            66


 


NIBP                           210/88


 


NIBP BP-Mean                   128


 


Respiration from ECG           21


 


SpO2                           93














I&O: 


                                        











 09/18/20 09/19/20 09/20/20





 06:59 06:59 06:59


 


Intake Total 4174 1066.4 


 


Output Total 3125 1517 


 


Balance 1049 -450.6 











Result Diagrams: 


                                 09/19/20 04:00





                                 09/19/20 04:00


Additional Labs: 


I reviewed patient's labs and MAR


EKG Reviewed by me: Yes (Telemetry: Normal sinus rhythm)





Hospitalist ROS





- Review of Systems


Respiratory: reports: cough, dry, SOB with excertion.  denies: shortness of 

breath, hemoptysis, pleuritic pain, sputum, wheezing


Cardiovascular: denies: chest pain, palpitations, orthopnea, paroxysmal noc. 

dyspnea, edema, light headedness





- Medication


Medications: 


Active Medications











Generic Name Dose Route Start Last Admin





  Trade Name Freq  PRN Reason Stop Dose Admin


 


Albuterol/Ipratropium  3 ml  09/15/20 07:03  09/15/20 12:44





  Duoneb  NEB   3 ml





  [id number] PRN   Administration





  SOB &/or Wheezing  


 


Albuterol/Ipratropium  3 ml  09/18/20 10:30  09/19/20 10:29





  Ipratropium/Albuterol Sulfate 3 Ml Neb  NEB   3 ml





  [id number] LIDA   Administration


 


Aspirin  325 mg  09/15/20 09:00  09/19/20 10:10





  Aspirin  PER TUBE   325 mg





  DAILY LIDA   Administration


 


Enoxaparin Sodium  40 mg  09/15/20 09:00  09/19/20 10:11





  Lovenox  SC   40 mg





  0900 LIDA   Administration


 


Ceftriaxone Sodium 1 gm/  100 mls @ 200 mls/hr  09/16/20 09:00  09/19/20 10:09





  Sodium Chloride  IVPB   100 mls





  Q24HR LIDA   Administration


 


Labetalol HCl  10 mg  09/19/20 08:19  09/19/20 10:25





  Labetalol Hcl 100 Mg/20 Ml Vial  SLOW IVP   10 mg





  Q4H PRN   Administration





  SBP Greater Than 180  


 


Methylprednisolone Sodium Succinate  40 mg  09/15/20 09:00  09/19/20 10:11





  Solu-Medrol  IVP   40 mg





  Q6H LIDA   Administration


 


Montelukast Sodium  10 mg  09/17/20 09:00  09/19/20 10:10





  Montelukast Sodium 10 Mg Tablet  PER TUBE   10 mg





  DAILY LIDA   Administration


 


Pantoprazole Sodium  40 mg  09/18/20 09:00  09/19/20 10:11





  Pantoprazole 40 Mg Granules Packet  PER TUBE   40 mg





  DAILY LIDA   Administration














- Exam


General - other findings: Obese


Eye: anicteric sclera


ENT: moist mucosa


Neck: supple


Heart: RRR


Respiratory: wheezes


Gastrointestinal: soft, non-tender


Extremities: no cyanosis


Psychiatric: normal affect, normal behavior





Hosp A/P





- Plan








-Assessment





(1) Acute respiratory failure with hypoxia and hypercapnia


Code(s): J96.01 - ACUTE RESPIRATORY FAILURE WITH HYPOXIA; J96.02 - ACUTE 

RESPIRATORY FAILURE WITH HYPERCAPNIA   Status: Acute   





(2) COPD exacerbation


Code(s): J44.1 - CHRONIC OBSTRUCTIVE PULMONARY DISEASE W (ACUTE) EXACERBATION   

Status: Acute   





(3) Pneumothorax


Code(s): I50.9 - HEART FAILURE, UNSPECIFIED   Status: Acute   


Qualifiers: 


   Status: acute





(4) hypertensive urgency


Status: Acute   

















- Plan





* Patient extubated earlier today, clinically improving.


* She is on Rocephin, Azithromycin and Solumedrol IV, will continue for now


* GI and DVT Prophylaxis


* Patient is on tube feeds


* Iatrogenic pneumothorax improving


* Patient's blood pressures were significantly elevated.  We will start her on 

  hydralazine 25 mg 3 times daily and amlodipine 5 mg daily.  Will give one-time

  dose of hydralazine 50 mg and amlodipine 5 mg.  Will monitor vital signs and 

  titrate antihypertensives as needed.

## 2020-09-20 NOTE — RAD
EXAM: 

CHEST ONE VIEW



HISTORY:

Pneumonia. Follow-up evaluation.



COMPARISON:

9/19/2020



FINDINGS:

Right-sided vascular catheter remains in place. Cardiac silhouette is enlarged. There has been interv
al increase in patchy parenchymal airspace opacities at each lung base. There are probable small

bilateral pleural effusions identified. Pulmonary vascular congestion noted on the prior study does a
ppear mildly improved. No other interval change.



IMPRESSION:

Increase in parenchymal airspace opacities at each lung base which may be related to developing bibas
ilar pneumonia. There is question of small bilateral pleural effusions.



Reported By: Abe Patel 

Electronically Signed:  9/20/2020 9:15 AM

## 2020-09-20 NOTE — PDOC.HOSPP
- Subjective


Encounter Date: 09/20/20


Encounter Time: 09:00


Subjective: 


Pt seen for followup re: respiratory failure.  Sitting in chair, states she 

feels better.





- Objective


Vital Signs & Weight: 


                             Vital Signs (12 hours)











  Temp Pulse Resp BP Pulse Ox


 


 09/20/20 10:55   66  16  


 


 09/20/20 09:20   74   166/82 H 


 


 09/20/20 09:19   74   166/82 H 


 


 09/20/20 08:00  97.8 F    


 


 09/20/20 07:45   74  18  


 


 09/20/20 04:00  98.2 F    


 


 09/20/20 02:10   71  17   97


 


 09/20/20 00:00  98.1 F    








                                     Weight











Admit Weight                   277 lb


 


Weight                         259 lb 7.745 oz











                            Most Recent Monitor Data











Heart Rate from ECG            57


 


NIBP                           159/76


 


NIBP BP-Mean                   103


 


Respiration from ECG           20


 


SpO2                           100














I&O: 


                                        











 09/19/20 09/20/20 09/21/20





 06:59 06:59 06:59


 


Intake Total 1066.4 1090 320


 


Output Total 1517 1542 175


 


Balance -450.6 -452 145











Result Diagrams: 


                                 09/20/20 05:15





                                 09/20/20 05:15


Additional Labs: 


labs and MARs reviewed by me





Hospitalist ROS





- Review of Systems


Constitutional: denies: fever, chills, sweats, weakness, malaise


Respiratory: reports: cough, dry, SOB with excertion


Cardiovascular: denies: chest pain, palpitations, orthopnea, paroxysmal noc. 

dyspnea, edema, light headedness





- Medication


Medications: 


Active Medications











Generic Name Dose Route Start Last Admin





  Trade Name Freq  PRN Reason Stop Dose Admin


 


Albuterol/Ipratropium  3 ml  09/15/20 07:03  09/15/20 12:44





  Duoneb  NEB   3 ml





  [id number] PRN   Administration





  SOB &/or Wheezing  


 


Albuterol/Ipratropium  3 ml  09/18/20 10:30  09/20/20 10:55





  Ipratropium/Albuterol Sulfate 3 Ml Neb  NEB   3 ml





  [id number] LIDA   Administration


 


Amlodipine Besylate  5 mg  09/20/20 09:00  09/20/20 09:20





  Amlodipine 5 Mg Tab  PO   5 mg





  DAILY LIDA   Administration


 


Aspirin  325 mg  09/15/20 09:00  09/20/20 09:19





  Aspirin  PER TUBE   325 mg





  DAILY LIDA   Administration


 


Clonidine  0.1 mg  09/19/20 13:22  09/19/20 22:11





  Clonidine 0.1 Mg Tab  PO   0.1 mg





  Q4H PRN   Administration





  SBP Greater Than 180  


 


Enoxaparin Sodium  40 mg  09/15/20 09:00  09/20/20 09:20





  Lovenox  SC   40 mg





  0900 LIDA   Administration


 


Hydralazine HCl  25 mg  09/19/20 15:00  09/20/20 09:19





  Hydralazine 25 Mg Tab  PO   25 mg





  TID LIDA   Administration


 


Ceftriaxone Sodium 1 gm/  100 mls @ 200 mls/hr  09/16/20 09:00  09/20/20 09:24





  Sodium Chloride  IVPB   100 mls





  Q24HR LIDA   Administration


 


Labetalol HCl  10 mg  09/19/20 08:19  09/19/20 20:45





  Labetalol Hcl 100 Mg/20 Ml Vial  SLOW IVP   10 mg





  Q4H PRN   Administration





  SBP Greater Than 180  


 


Methylprednisolone Sodium Succinate  40 mg  09/15/20 09:00  09/20/20 09:25





  Solu-Medrol  IVP   40 mg





  Q6H LIDA   Administration


 


Montelukast Sodium  10 mg  09/17/20 09:00  09/20/20 09:19





  Montelukast Sodium 10 Mg Tablet  PER TUBE   10 mg





  DAILY LIDA   Administration


 


Pantoprazole Sodium  40 mg  09/18/20 09:00  09/20/20 09:19





  Pantoprazole 40 Mg Granules Packet  PER TUBE   40 mg





  DAILY LIDA   Administration














- Exam


General - other findings: Obese


Eye: anicteric sclera


ENT: moist mucosa


Neck: supple, no JVD


Heart: RRR, no rubs


Respiratory: CTAB


Gastrointestinal: soft, non-tender


Extremities: no cyanosis


Psychiatric: normal affect, normal behavior





Hosp A/P





- Plan








-Assessment





(1) Acute respiratory failure with hypoxia and hypercapnia


Code(s): J96.01 - ACUTE RESPIRATORY FAILURE WITH HYPOXIA; J96.02 - ACUTE 

RESPIRATORY FAILURE WITH HYPERCAPNIA   Status: Acute   





(2) COPD exacerbation


Code(s): J44.1 - CHRONIC OBSTRUCTIVE PULMONARY DISEASE W (ACUTE) EXACERBATION   

Status: Acute   





(3) Pneumothorax


Code(s): I50.9 - HEART FAILURE, UNSPECIFIED   Status: Acute   


Qualifiers: 


   Status: acute





(4) hypertensive urgency


Status: Acute   

















- Plan





* Patient extubated yesterday.  Clinically improved.


* Continue Rocephin, Azithromycin and Solumedrol IV


* GI and DVT Prophylaxis


* Pt is on oral diet.


* Iatrogenic pneumothorax improving


* Patient's blood pressures improved but still high. Increase amlodipine to 10 

  mg daily.

## 2020-09-20 NOTE — PRG
DATE OF SERVICE:  09/19/2020



SUBJECTIVE:  Ms. Bravo was successfully extubated yesterday.  Her oxygen status

has been good and currently on nasal cannula at 3 L.  She is awake and alert and has

taken a diet without difficulty with choking.  Her blood pressure has been fairly

high.  She cannot tell me the name of medications she takes at home and we will try

to contact her pharmacy ([facility] in [place]).  She denies pain, nausea, or

vomiting. 



PHYSICAL EXAMINATION:

VITAL SIGNS:  Blood pressure as high as 210 systolic, heart rate 78, pulse ox 91%,

and respiratory rate 15. 

GENERAL: She is awake and alert.  She appears older than her stated age. 

HEENT: Shows an enlarged neck.  She has no oral Candida. 

LUNGS:  Show crepitus over the anterior right chest consistent with a subcutaneous

emphysema.  She has a right central line, subclavian.  The left lung is fairly

clear. 

HEART:  Regular rate and rhythm.  I do not hear a murmur. 

ABDOMEN:  Obese, mildly distended.  There is no guarding or rebound. 

EXTREMITIES:  Show 1+ edema. 

NEUROLOGICAL: She is intact.



LABORATORY DATA:  White count 9300, hemoglobin is 14.3 with hematocrit of 43.4, and

platelet count 245,000.  Electrolytes today include sodium 143, potassium 4.8,

chloride 109, CO2 of 34, BUN 28, and creatinine 0.6. 



IMAGING DATA:  Her chest x-ray shows LV prominence and slightly fluffy central

vasculature.  Lung markings show interstitial prominence.  There is no visible

pneumothorax.  Central line is in place and chest tube has been removed.  There is

poor inspiration. 



IMPRESSION:  

1. Respiratory failure, requiring mechanical ventilation, now extubated.

2. Right pneumothorax, presumably secondary to mainstem intubation.  This has been

resolved and chest tube removed.  She does have a small amount of subcutaneous

emphysema. 

3. History of heavy alcohol abuse.  She has not had any DTs.  We will continue to

observe clinically. 

4. Hypertension.  We will try to get her medication list and resume home medicines,

although I am not very convinced that she takes anything consistently.  A reasonable

medicine to use on a short-term basis given her alcohol and possible withdrawal

would be clonidine. 



PLAN:  We will continue current therapies.  We will try to get her medication list

from home.  I am going to have the bandage removed from her chest tube as the

Vaseline may be preventing any escape of air.  We will try to get her out of bed and

advance her diet.  From my perspective, she could probably go to the floor once her

blood pressure is under a little better control.  We will use labetalol IV or

clonidine p.o. given her specific clinical situation. 







Job ID:  [id number]

## 2020-09-20 NOTE — PRG
DATE OF SERVICE:  09/20/2020



SUBJECTIVE:  Ms. Bravo has done well throughout the night following extubation.

She had stable vital signs.  She has been able to sit up in the chair and take a

diet without difficulty.  There has been no significant drainage from her chest tube

site.  Fortunately, she has had no evidence of alcohol withdrawal symptoms. 



PHYSICAL EXAMINATION:

VITAL SIGNS:  Blood pressure 166/82, heart rate 74, saturation 95% on 2 L nasal

cannula. 

GENERAL:  She is awake, alert.  She still has no significant recollection of events

prompting this hospitalization.  She denies pain. 

HEENT:  Shows no adenopathy.  She has no Candida. 

NECK:  She has no JVD. 

LUNGS:  Bibasilar rhonchi.  She has minimal residual subcutaneous emphysema over the

anterior upper right chest.  The chest tube site beneath the breast has a dry open

dressing. 

HEART:  Regular rate and rhythm.  There is no murmur. 

ABDOMEN:  Obese and soft.  There is no organomegaly.  She has 1 to 2+ pretibial

edema. 



LABORATORY DATA:  Chest x-ray shows no significant pneumothorax.  There is

cardiomegaly with LV prominence.  There is area of patchy consolidation, especially

in the right base, which obscures the diaphragm but not the right heart border. 



White count today is 11,000, hemoglobin is 14 with hematocrit of 46.6, and platelet

count 261,000.  Electrolytes include sodium 140, potassium 4.5, chloride 99, CO2 is

33, BUN 27, creatinine 0.6. 



IMPRESSION:  

1. Respiratory failure, now resolved.  Exact etiology is unclear.

2. History of alcohol abuse without evidence of DTs.

3. Right pneumothorax, probably secondary to mainstem intubation and over

expansion/barotrauma. 

4. Hypertension.



PLAN:  She will continue with current medications as she has much improved taking a

diet.  From my perspective, she could be transferred either to the step-down unit or

to the medical floor.  She will continue and complete her current round of

therapies.  I have not placed her on DT prophylaxis, but will observe clinically. 







Job ID:  [id number]

## 2020-09-21 NOTE — PDOC.HOSPP
- Subjective


Encounter Date: 09/21/20


Encounter Time: 07:20


Subjective: 


Patient seen for follow-up for acute respiratory failure.  She reports feeling 

better.  She denies fevers or chills.





- Objective


Vital Signs & Weight: 


                             Vital Signs (12 hours)











  Temp Pulse Resp BP Pulse Ox


 


 09/21/20 11:06  97.7 F  76  19  162/71 H  96


 


 09/21/20 10:42   82  16   98


 


 09/21/20 08:12      97


 


 09/21/20 07:56  97.5 F L  75  20  199/80 H  97


 


 09/21/20 06:43      89 L


 


 09/21/20 06:41   94  16   89 L


 


 09/21/20 03:09  98.0 F  73  18  173/75 H  94 L








                                     Weight











Admit Weight                   277 lb


 


Weight                         259 lb 7.745 oz











                            Most Recent Monitor Data











Heart Rate from ECG            73


 


NIBP                           133/75


 


NIBP BP-Mean                   94


 


Respiration from ECG           18


 


SpO2                           100














I&O: 


                                        











 09/20/20 09/21/20 09/22/20





 06:59 06:59 06:59


 


Intake Total 1090 1650 


 


Output Total 1542 1705 


 


Balance -452 -55 











Result Diagrams: 


                                 09/21/20 04:06





                                 09/21/20 04:06


Additional Labs: 


I reviewed patient's labs and MAR


EKG Reviewed by me: Yes (Telemetry: Normal sinus rhythm)





Hospitalist ROS





- Review of Systems


Constitutional: reports: weakness


Respiratory: reports: cough, dry, SOB with excertion


Cardiovascular: denies: chest pain, palpitations, orthopnea, paroxysmal noc. 

dyspnea, edema, light headedness


Skin: denies: rash, lesions, karla, bruising


Neurological: reports: weakness





- Medication


Medications: 


Active Medications











Generic Name Dose Route Start Last Admin





  Trade Name Freq  PRN Reason Stop Dose Admin


 


Albuterol/Ipratropium  3 ml  09/15/20 07:03  09/15/20 12:44





  Duoneb  NEB   3 ml





  [id number] PRN   Administration





  SOB &/or Wheezing  


 


Albuterol/Ipratropium  3 ml  09/18/20 10:30  09/21/20 10:42





  Ipratropium/Albuterol Sulfate 3 Ml Neb  NEB   3 ml





  [id number] LIDA   Administration


 


Amlodipine Besylate  10 mg  09/21/20 09:00  09/21/20 08:59





  Amlodipine 10 Mg Tab  PO   10 mg





  DAILY LIDA   Administration


 


Aspirin  325 mg  09/15/20 09:00  09/21/20 08:59





  Aspirin  PER TUBE   325 mg





  DAILY LIDA   Administration


 


Clonidine  0.1 mg  09/19/20 13:22  09/20/20 15:13





  Clonidine 0.1 Mg Tab  PO   0.1 mg





  Q4H PRN   Administration





  SBP Greater Than 180  


 


Enoxaparin Sodium  40 mg  09/15/20 09:00  09/21/20 09:01





  Lovenox  SC   40 mg





  0900 LIDA   Administration


 


Hydralazine HCl  25 mg  09/19/20 15:00  09/21/20 09:00





  Hydralazine 25 Mg Tab  PO   25 mg





  TID LIDA   Administration


 


Labetalol HCl  10 mg  09/19/20 08:19  09/19/20 20:45





  Labetalol Hcl 100 Mg/20 Ml Vial  SLOW IVP   10 mg





  Q4H PRN   Administration





  SBP Greater Than 180  


 


Montelukast Sodium  10 mg  09/17/20 09:00  09/21/20 09:00





  Montelukast Sodium 10 Mg Tablet  PER TUBE   10 mg





  DAILY LIDA   Administration


 


Pantoprazole Sodium  40 mg  09/18/20 09:00  09/21/20 09:00





  Pantoprazole 40 Mg Granules Packet  PER TUBE   40 mg





  DAILY LIDA   Administration














- Exam


General - other findings: Morbid obesity


Eye: anicteric sclera


ENT: moist mucosa


Neck: supple


Heart: RRR


Respiratory: CTAB


Gastrointestinal: soft, non-tender


Skin: no rashes


Psychiatric: normal affect, normal behavior





Hosp A/P





- Plan








-Assessment





(1) Acute respiratory failure with hypoxia and hypercapnia


Code(s): J96.01 - ACUTE RESPIRATORY FAILURE WITH HYPOXIA; J96.02 - ACUTE 

RESPIRATORY FAILURE WITH HYPERCAPNIA   Status: Acute   





(2) COPD exacerbation


Code(s): J44.1 - CHRONIC OBSTRUCTIVE PULMONARY DISEASE W (ACUTE) EXACERBATION   

Status: Acute   





(3) Pneumothorax


Code(s): I50.9 - HEART FAILURE, UNSPECIFIED   Status: Acute   


Qualifiers: 


   Status: acute





(4) hypertensive urgency


Status: Acute   

















- Plan





* Patient slowly improving.


* Antibiotics and steroids have been switched to oral.


* GI and DVT Prophylaxis


* Pt is on oral diet.


* Iatrogenic pneumothorax improved.


* Patient's blood pressures improved but still high.  Add lisinopril 5 mg daily.


* DC Jurado catheter.


* Ambulate patient.


* Transfer to medical floor.  No current indication for telemetry.

## 2020-09-21 NOTE — PRG
DATE OF SERVICE:  09/21/2020



SUBJECTIVE:  This morning, she is less short of breath.



OBJECTIVE:  VITAL SIGNS:  Sats 97% on 2 L, temperature 97, pulse 75, and blood

pressure 199/80. 

CHEST:  Decreased breath sounds.  No wheezing. 

CARDIAC:  Normal S1, S2 __________.



LABORATORY DATA:  Unremarkable.  X-ray shows nonspecific bibasilar infiltrates,

right greater than left.  No pneumothorax.  Cardiomegaly. 



ASSESSMENT:  Chronic obstructive pulmonary disease exacerbation, bronchitis, right

pneumothorax, morbid obesity, tobacco abuse, hypertension. 



PLAN:  We will switch her to oral medication and aggressive PT.  Hopefully, she can

be discharged home in the next several days.  She is probably going to need a

baseline outpatient sleep study. 



She can then follow up with primary care physician.







Job ID:  [id number]

## 2020-09-21 NOTE — RAD
Exam: Chest one view



HISTORY:Pneumonia



Comparison: 9/20/2020



FINDINGS:

Cardiac silhouette:Cardiomegaly

Aorta: Elongated

Pulmonary vessels: Within normal limits

Costophrenic angles: Clear



LUNGS: Scattered interstitial opacities with more focal alveolar opacification the lung bases.

Pneumothorax: None



Osseous abnormalities: None



IMPRESSION: 

1. Cardiomegaly with interstitial and alveolar opacities. Correlate for congestive heart failure. Sup
erimposed pneumonia cannot be excluded. Continued surveillance is recommended.



Reported By: Ishmael Hebert 

Electronically Signed:  9/21/2020 7:50 AM

## 2020-09-22 NOTE — RAD
Chest AP view



INDICATION: Chest pain



COMPARISON: September 21, 2020



FINDINGS:



Lungs:  Bibasilar airspace disease persists



Cardiac silhouette:  Cardiomegaly is similar



Pulmonary vasculature:  Vascular congestion is stable



Pleural spaces:  Small bilateral pleural effusions persist



Upper abdomen:  No abnormality seen.



Osseous structures:  No acute osseous abnormality.



Additional findings:  None.



IMPRESSION: 

Stable exam



Reported By: Zafar Haile 

Electronically Signed:  9/22/2020 7:36 AM

## 2020-09-22 NOTE — PDOC.HOSPP
- Subjective


Encounter Date: 09/22/20


Encounter Time: 09:30


Subjective: 


Patient seen for follow-up regarding acute hypoxic respiratory failure.  Reports

she feels better.  Not ambulating much.





- Objective


Vital Signs & Weight: 


                             Vital Signs (12 hours)











  Temp Pulse Resp BP Pulse Ox


 


 09/22/20 10:11   66  14   95


 


 09/22/20 08:26   68   


 


 09/22/20 08:00      96


 


 09/22/20 07:44   68  16   96


 


 09/22/20 07:37  97.9 F  68  16  145/79 H  96


 


 09/22/20 04:00  97.8 F  61  18  147/81 H  97


 


 09/22/20 02:13    12  








                                     Weight











Admit Weight                   277 lb 8.992 oz


 


Weight                         259 lb 7.745 oz











                            Most Recent Monitor Data











Heart Rate from ECG            73


 


NIBP                           133/75


 


NIBP BP-Mean                   94


 


Respiration from ECG           18


 


SpO2                           100














I&O: 


                                        











 09/21/20 09/22/20 09/23/20





 06:59 06:59 06:59


 


Intake Total 1650 940 360


 


Output Total 1705 2300 


 


Balance -55 -1360 360











Result Diagrams: 


                                 09/21/20 04:06





                                 09/21/20 04:06


Additional Labs: 


I reviewed patient's labs and MAR





Hospitalist ROS





- Review of Systems


Respiratory: reports: SOB with excertion.  denies: cough, shortness of breath, 

pleuritic pain, wheezing


Cardiovascular: denies: chest pain, palpitations, orthopnea, paroxysmal noc. 

dyspnea, edema, light headedness, other





- Medication


Medications: 


Active Medications











Generic Name Dose Route Start Last Admin





  Trade Name Freq  PRN Reason Stop Dose Admin


 


Albuterol/Ipratropium  3 ml  09/15/20 07:03  09/15/20 12:44





  Duoneb  NEB   3 ml





  [id number] PRN   Administration





  SOB &/or Wheezing  


 


Albuterol/Ipratropium  3 ml  09/18/20 10:30  09/22/20 10:11





  Ipratropium/Albuterol Sulfate 3 Ml Neb  NEB   3 ml





  [id number] LIDA   Administration


 


Amlodipine Besylate  10 mg  09/21/20 09:00  09/22/20 08:26





  Amlodipine 10 Mg Tab  PO   10 mg





  DAILY LIDA   Administration


 


Aspirin  325 mg  09/15/20 09:00  09/22/20 08:25





  Aspirin  PER TUBE   325 mg





  DAILY LIDA   Administration


 


Clonidine  0.1 mg  09/19/20 13:22  09/20/20 15:13





  Clonidine 0.1 Mg Tab  PO   0.1 mg





  Q4H PRN   Administration





  SBP Greater Than 180  


 


Doxycycline Hyclate  100 mg  09/21/20 21:00  09/22/20 11:05





  Doxycycline 100 Mg Cap  PO  09/28/20 21:01  100 mg





  BID LIDA   Administration


 


Enoxaparin Sodium  40 mg  09/15/20 09:00  09/22/20 08:30





  Lovenox  SC   40 mg





  0900 LIDA   Administration


 


Hydralazine HCl  25 mg  09/19/20 15:00  09/22/20 08:26





  Hydralazine 25 Mg Tab  PO   25 mg





  TID LIDA   Administration


 


Labetalol HCl  10 mg  09/19/20 08:19  09/19/20 20:45





  Labetalol Hcl 100 Mg/20 Ml Vial  SLOW IVP   10 mg





  Q4H PRN   Administration





  SBP Greater Than 180  


 


Lisinopril  5 mg  09/22/20 09:00  09/22/20 08:26





  Lisinopril 5 Mg Tab  PO   5 mg





  DAILY LIDA   Administration


 


Mometasone Furoate/Formoterol Fumar  2 puff  09/21/20 18:30  09/22/20 07:44





  Mometasone 200 Mcg/Formoterol 5 Mcg 120 Puff Inhaler  INH   2 puff





  BID-RT LIDA   Administration


 


Montelukast Sodium  10 mg  09/17/20 09:00  09/22/20 08:26





  Montelukast Sodium 10 Mg Tablet  PER TUBE   10 mg





  DAILY LIDA   Administration


 


Pantoprazole Sodium  40 mg  09/18/20 09:00  09/22/20 08:25





  Pantoprazole 40 Mg Granules Packet  PER TUBE   40 mg





  DAILY LIDA   Administration


 


Prednisone  40 mg  09/22/20 08:00  09/22/20 08:26





  Prednisone 20 Mg Tab  PO   40 mg





  QAM-WM LIDA   Administration














- Exam


General - other findings: Obese


Eye: anicteric sclera


ENT: moist mucosa


Neck: supple


Heart: RRR


Respiratory: CTAB


Gastrointestinal: soft, non-tender


Musculoskeletal: no muscle wasting


Psychiatric: normal affect, normal behavior





Hosp A/P





- Plan








-Assessment





(1) Acute respiratory failure with hypoxia and hypercapnia


Code(s): J96.01 - ACUTE RESPIRATORY FAILURE WITH HYPOXIA; J96.02 - ACUTE 

RESPIRATORY FAILURE WITH HYPERCAPNIA   Status: Acute   





(2) COPD exacerbation


Code(s): J44.1 - CHRONIC OBSTRUCTIVE PULMONARY DISEASE W (ACUTE) EXACERBATION   

Status: Acute   





(3) Pneumothorax


Code(s): I50.9 - HEART FAILURE, UNSPECIFIED   Status: Acute   


Qualifiers: 


   Status: acute





(4) hypertensive urgency


Status: Acute   

















- Plan





* Patient slowly improving.


* Continue oral antibiotics and steroids 


* GI and DVT Prophylaxis


* Pt is on oral diet.


* Iatrogenic pneumothorax improved.


* Patient's blood pressures improved but still high.  Add lisinopril 5 mg daily.


* Ambulate patient.


* Try to wean patient off of oxygen.

## 2020-09-22 NOTE — PRG
DATE OF SERVICE:  09/22/2020



SUBJECTIVE:  Andrés Bravo is doing well.



OBJECTIVE:  VITAL SIGNS:  Pulse 68, saturations are 96%, respiratory rate 16, blood

pressure 130/80. 

LUNGS:  Decreased breath sounds.  No wheezing. 

CARDIAC:  Normal S1, S2.  No gallops. 

ABDOMEN:  Soft.  No mass.



ASSESSMENT:  Chronic obstructive pulmonary disease exacerbation, respiratory

failure, morbid obesity. 



PLAN:  P.o. antibiotics and steroids were initiated.  Discontinue Jurado.  Hopefully,

she can be sent home in the next several days. 







Job ID:  [id number]

## 2020-09-23 NOTE — RAD
RADIOGRAPH CHEST 1 VIEW:

 

Date: 9/23/2020. 

Time: 5:27 a.m.

 

HISTORY: 

A 60-year-old female with pneumonia.

 

COMPARISON: 

9/22/2020, 5:04 a.m.

 

FINDINGS:

Bilateral pleural effusions.  Airspace densities at bilateral lung bases, left more than right.  The 
right may be subsegmental atelectasis abutting the right hemidiaphragm.  Mild pulmonary vasculature e
ngorgement.  Cardiomegaly.  Left lower lobe opacification and/or elevated left hemidiaphragm.  No pne
umothorax.  No major interval change.

 

IMPRESSION:

No major interval change.

 

 

JN []

 

POS: [facility]

## 2020-09-23 NOTE — PDOC.HOSPP
- Subjective


Encounter Date: 09/23/20


Encounter Time: 09:30


Subjective: 


Patient follow-up for respiratory failure.  Sitting in chair, still on 

supplemental oxygen.  Reports feeling better.





- Objective


Vital Signs & Weight: 


                             Vital Signs (12 hours)











  Temp Pulse Resp BP Pulse Ox


 


 09/23/20 10:10   65  16   96


 


 09/23/20 08:00      96


 


 09/23/20 07:48  98.0 F  72  20  117/74  96


 


 09/23/20 05:18   67  16   97


 


 09/23/20 05:16   67  16   97








                                     Weight











Admit Weight                   277 lb 8.992 oz


 


Weight                         259 lb 7.745 oz











                            Most Recent Monitor Data











Heart Rate from ECG            73


 


NIBP                           133/75


 


NIBP BP-Mean                   94


 


Respiration from ECG           18


 


SpO2                           100














I&O: 


                                        











 09/22/20 09/23/20 09/24/20





 06:59 06:59 06:59


 


Intake Total 940 1410 


 


Output Total 2300 4050 


 


Balance -1360 -2640 











Result Diagrams: 


                                 09/21/20 04:06





                                 09/21/20 04:06


Additional Labs: 


I reviewed patient's labs and MAR





Hospitalist ROS





- Review of Systems


Respiratory: reports: SOB with excertion.  denies: cough, shortness of breath, 

pleuritic pain, wheezing


Cardiovascular: denies: chest pain, palpitations, orthopnea, paroxysmal noc. 

dyspnea, edema, light headedness





- Medication


Medications: 


Active Medications











Generic Name Dose Route Start Last Admin





  Trade Name Freq  PRN Reason Stop Dose Admin


 


Albuterol/Ipratropium  3 ml  09/15/20 07:03  09/15/20 12:44





  Duoneb  NEB   3 ml





  [id number] PRN   Administration





  SOB &/or Wheezing  


 


Albuterol/Ipratropium  3 ml  09/18/20 10:30  09/23/20 13:59





  Ipratropium/Albuterol Sulfate 3 Ml Neb  NEB   3 ml





  [id number] LIDA   Administration


 


Amlodipine Besylate  10 mg  09/21/20 09:00  09/23/20 08:22





  Amlodipine 10 Mg Tab  PO   10 mg





  DAILY LIDA   Administration


 


Aspirin  325 mg  09/15/20 09:00  09/23/20 08:22





  Aspirin  PER TUBE   325 mg





  DAILY LIDA   Administration


 


Clonidine  0.1 mg  09/19/20 13:22  09/20/20 15:13





  Clonidine 0.1 Mg Tab  PO   0.1 mg





  Q4H PRN   Administration





  SBP Greater Than 180  


 


Doxycycline Hyclate  100 mg  09/21/20 21:00  09/23/20 08:22





  Doxycycline 100 Mg Cap  PO  09/28/20 21:01  100 mg





  BID LIDA   Administration


 


Enoxaparin Sodium  40 mg  09/15/20 09:00  09/23/20 08:24





  Lovenox  SC   40 mg





  0900 LIDA   Administration


 


Hydralazine HCl  25 mg  09/19/20 15:00  09/23/20 08:22





  Hydralazine 25 Mg Tab  PO   25 mg





  TID LIDA   Administration


 


Labetalol HCl  10 mg  09/19/20 08:19  09/19/20 20:45





  Labetalol Hcl 100 Mg/20 Ml Vial  SLOW IVP   10 mg





  Q4H PRN   Administration





  SBP Greater Than 180  


 


Lisinopril  5 mg  09/22/20 09:00  09/23/20 08:22





  Lisinopril 5 Mg Tab  PO   5 mg





  DAILY LIDA   Administration


 


Mometasone Furoate/Formoterol Fumar  2 puff  09/21/20 18:30  09/23/20 05:18





  Mometasone 200 Mcg/Formoterol 5 Mcg 120 Puff Inhaler  INH   2 puff





  BID-RT LIDA   Administration


 


Montelukast Sodium  10 mg  09/17/20 09:00  09/23/20 08:22





  Montelukast Sodium 10 Mg Tablet  PER TUBE   10 mg





  DAILY LIDA   Administration


 


Pantoprazole Sodium  40 mg  09/18/20 09:00  09/23/20 08:25





  Pantoprazole 40 Mg Granules Packet  PER TUBE   Not Given





  DAILY [facility]  


 


Prednisone  40 mg  09/22/20 08:00  09/23/20 08:23





  Prednisone 20 Mg Tab  PO   40 mg





  QAM-WM LIDA   Administration














- Exam


General - other findings: Obese


Eye: anicteric sclera


ENT: moist mucosa


Neck: supple


Heart: RRR


Respiratory: CTAB


Gastrointestinal: soft, non-tender


Extremities: no cyanosis


Skin: no rashes


Psychiatric: normal affect, normal behavior





Hosp A/P





- Plan








-Assessment





(1) Acute respiratory failure with hypoxia and hypercapnia


Code(s): J96.01 - ACUTE RESPIRATORY FAILURE WITH HYPOXIA; J96.02 - ACUTE 

RESPIRATORY FAILURE WITH HYPERCAPNIA   Status: Acute   





(2) COPD exacerbation


Code(s): J44.1 - CHRONIC OBSTRUCTIVE PULMONARY DISEASE W (ACUTE) EXACERBATION   

Status: Acute   





(3) Pneumothorax


Code(s): I50.9 - HEART FAILURE, UNSPECIFIED   Status: Acute   


Qualifiers: 


   Status: acute





(4) hypertensive urgency


Status: Acute   

















- Plan





* Patient slowly improving.  Discontinue Jurado catheter.


* Patient is on oral antibiotics and steroids 


* Iatrogenic pneumothorax improved.


* Pretension is controlled today, after adding lisinopril yesterday.


* Ambulate patient.


* Try to wean patient off of oxygen.

## 2020-09-23 NOTE — PRG
DATE OF SERVICE:  09/23/2020



SUBJECTIVE:  Andrés Bravo is much improved this morning.  She says she is less

short of breath, less cough.  X-ray shows stable findings. 



OBJECTIVE:  VITAL SIGNS:  Temperature 98, pulse 72, respiratory rate 20, sats 90% on

room air, blood pressure 170/74. 

CHEST:  No wheezing.  No crackles. 

CARDIAC:  Normal S1, S2.  No gallops. 

ABDOMEN:  No masses.



ASSESSMENT:  

1. Chronic obstructive pulmonary disease.

2. Spontaneous pneumothorax.

3. Chronic left pleural effusion.



PLAN:  She is ready to be discharged to rehab.  I would discontinue her Jurado.







Job ID:  [id number]

## 2020-09-24 NOTE — PRG
DATE OF SERVICE:  09/24/2020



SUBJECTIVE:  Andrés Bravo is a 60-year-old morbidly obese female, status post

respiratory failure and pneumothorax.  She is doing better. 



OBJECTIVE:  VITAL SIGNS:  Temperature 98, pulse 76, respiratory rate 18, saturations

95% on room air, blood pressure 90/50. 

CHEST:  Decreased breath sounds.  No wheezing. 

CARDIAC:  Normal S1, S2. 

ABDOMEN:  No masses.



ASSESSMENT AND PLAN:  Chronic obstructive pulmonary disease, respiratory failure,

morbid obesity, improved. 



She is on steroids, neb treatments, eventually rehab. 



We will follow.







Job ID:  [id number]

## 2020-09-24 NOTE — PDOC.HOSPP
- Subjective


Encounter Date: 09/24/20


Encounter Time: 09:00


Subjective: 


Patient seen for follow-up regarding acute hypoxic respiratory failure.  Reports

feeling better today.





- Objective


Vital Signs & Weight: 


                             Vital Signs (12 hours)











  Temp Pulse Resp BP BP BP Pulse Ox


 


 09/24/20 16:54  98.1 F  80  20   112/69   94 L


 


 09/24/20 15:21   85   117/69   


 


 09/24/20 13:59   83  16     93 L


 


 09/24/20 12:27  98.0 F  81  20   128/77   94 L


 


 09/24/20 11:45   81   128/77   


 


 09/24/20 11:44   81   128/77   


 


 09/24/20 10:13   82  18     93 L


 


 09/24/20 09:06       


 


 09/24/20 08:32  98.1 F  76  18    94/54 L  94 L


 


 09/24/20 07:31   80  18     93 L














  Pulse Ox Pulse Ox Pulse Ox


 


 09/24/20 16:54   


 


 09/24/20 15:21   


 


 09/24/20 13:59   


 


 09/24/20 12:27   


 


 09/24/20 11:45   


 


 09/24/20 11:44   


 


 09/24/20 10:13   


 


 09/24/20 09:06  94 L  90 L  93 L


 


 09/24/20 08:32   


 


 09/24/20 07:31   








                                     Weight











Admit Weight                   277 lb 8.992 oz


 


Weight                         259 lb 7.745 oz











                            Most Recent Monitor Data











Heart Rate from ECG            73


 


NIBP                           133/75


 


NIBP BP-Mean                   94


 


Respiration from ECG           18


 


SpO2                           100














I&O: 


                                        











 09/23/20 09/24/20 09/25/20





 06:59 06:59 06:59


 


Intake Total 1410 2150 


 


Output Total 4050 3000 


 


Balance -2640 -850 











Result Diagrams: 


                                 09/21/20 04:06





                                 09/21/20 04:06


Additional Labs: 


I reviewed patient's labs and MAR





Hospitalist ROS





- Review of Systems


Constitutional: reports: weakness


Respiratory: reports: SOB with excertion.  denies: cough, shortness of breath, 

pleuritic pain, wheezing


Cardiovascular: denies: chest pain, palpitations, orthopnea, paroxysmal noc. 

dyspnea, edema, light headedness





- Medication


Medications: 


Active Medications











Generic Name Dose Route Start Last Admin





  Trade Name Freq  PRN Reason Stop Dose Admin


 


Albuterol/Ipratropium  3 ml  09/15/20 07:03  09/15/20 12:44





  Duoneb  NEB   3 ml





  [id number] PRN   Administration





  SOB &/or Wheezing  


 


Albuterol/Ipratropium  3 ml  09/18/20 10:30  09/24/20 13:59





  Ipratropium/Albuterol Sulfate 3 Ml Neb  NEB   3 ml





  [id number] LIDA   Administration


 


Amlodipine Besylate  10 mg  09/21/20 09:00  09/24/20 11:44





  Amlodipine 10 Mg Tab  PO   10 mg





  DAILY LIDA   Administration


 


Aspirin  325 mg  09/15/20 09:00  09/24/20 08:49





  Aspirin  PER TUBE   325 mg





  DAILY LIDA   Administration


 


Clonidine  0.1 mg  09/19/20 13:22  09/20/20 15:13





  Clonidine 0.1 Mg Tab  PO   0.1 mg





  Q4H PRN   Administration





  SBP Greater Than 180  


 


Doxycycline Hyclate  100 mg  09/21/20 21:00  09/24/20 08:50





  Doxycycline 100 Mg Cap  PO  09/28/20 21:01  100 mg





  BID LIDA   Administration


 


Enoxaparin Sodium  40 mg  09/15/20 09:00  09/24/20 08:51





  Lovenox  SC   40 mg





  0900 LIDA   Administration


 


Hydralazine HCl  25 mg  09/19/20 15:00  09/24/20 15:21





  Hydralazine 25 Mg Tab  PO   25 mg





  TID LIDA   Administration


 


Labetalol HCl  10 mg  09/19/20 08:19  09/19/20 20:45





  Labetalol Hcl 100 Mg/20 Ml Vial  SLOW IVP   10 mg





  Q4H PRN   Administration





  SBP Greater Than 180  


 


Lisinopril  5 mg  09/22/20 09:00  09/24/20 11:45





  Lisinopril 5 Mg Tab  PO   5 mg





  DAILY LIDA   Administration


 


Mometasone Furoate/Formoterol Fumar  2 puff  09/21/20 18:30  09/24/20 07:31





  Mometasone 200 Mcg/Formoterol 5 Mcg 120 Puff Inhaler  INH   2 puff





  BID-RT LIDA   Administration


 


Montelukast Sodium  10 mg  09/17/20 09:00  09/24/20 08:50





  Montelukast Sodium 10 Mg Tablet  PER TUBE   10 mg





  DAILY LIDA   Administration


 


Pantoprazole Sodium  40 mg  09/18/20 09:00  09/24/20 08:49





  Pantoprazole 40 Mg Granules Packet  PER TUBE   40 mg





  DAILY LIDA   Administration


 


Prednisone  40 mg  09/22/20 08:00  09/24/20 08:51





  Prednisone 20 Mg Tab  PO   40 mg





  QAM-WM LIDA   Administration














- Exam


General - other findings: Morbid obesity


Eye: anicteric sclera


ENT: no oropharyngeal lesions


Neck: supple


Heart: RRR


Respiratory: CTAB


Gastrointestinal: soft, non-tender


Extremities: no cyanosis


Skin: normal turgor


Psychiatric: normal affect, normal behavior





Hosp A/P





- Plan








-Assessment





(1) Acute respiratory failure with hypoxia and hypercapnia


Code(s): J96.01 - ACUTE RESPIRATORY FAILURE WITH HYPOXIA; J96.02 - ACUTE 

RESPIRATORY FAILURE WITH HYPERCAPNIA   Status: Acute   





(2) COPD exacerbation


Code(s): J44.1 - CHRONIC OBSTRUCTIVE PULMONARY DISEASE W (ACUTE) EXACERBATION   

Status: Acute   





(3) Pneumothorax


Code(s): I50.9 - HEART FAILURE, UNSPECIFIED   Status: Acute   


Qualifiers: 


   Status: acute





(4) hypertensive urgency


Status: Resolved   

















- Plan





* Patient has improved significantly in terms of respiratory failure.  She is on

  room air today.


* Continue oral antibiotics and steroids 


* Iatrogenic pneumothorax improved.


* Stable


* Ambulate patient.


* Patient is off of oxygen.


* Discharge planning in process.

## 2020-09-25 NOTE — PRG
DATE OF SERVICE:  09/25/2020



SUBJECTIVE:  Andrés Bravo is a 60-year-old female.  She is better.



OBJECTIVE:  VITAL SIGNS:  Temperature 98, __________ on room air, blood pressure

130/71. 

CHEST:  Decreased breath sounds.  Rhonchi bilaterally. 

CARDIAC:  Normal S1, S2.  No gallops. 

ABDOMEN:  No masses.



IMPRESSION:  

1. Chronic obstructive pulmonary disease.

2. Respiratory failure.

3. Spontaneous pneumothorax, resolved.

4. Severe deconditioning.



PLAN:  We are trying to get a placement for her.  Otherwise continue PT, supportive

care.  Follow up with Dr. Field later. 







Job ID:  [id number]

## 2020-09-25 NOTE — PDOC.HOSPP
- Subjective


Encounter Date: 09/25/20


Encounter Time: 09:00


Subjective: 


Patient seen for follow-up regarding acute hypoxic respiratory failure-sitting 

in chair, no complaints at this time.





- Objective


Vital Signs & Weight: 


                             Vital Signs (12 hours)











  Temp Pulse Resp BP BP Pulse Ox


 


 09/25/20 15:44   84   131/73  


 


 09/25/20 12:57   79  16    96


 


 09/25/20 08:51   80   130/71  


 


 09/25/20 08:00       96


 


 09/25/20 07:26  97.8 F  80  18   130/71  96


 


 09/25/20 07:09   80  16    98


 


 09/25/20 07:08   80  16    98








                                     Weight











Admit Weight                   277 lb 8.992 oz


 


Weight                         259 lb 7.745 oz











                            Most Recent Monitor Data











Heart Rate from ECG            73


 


NIBP                           133/75


 


NIBP BP-Mean                   94


 


Respiration from ECG           18


 


SpO2                           100














I&O: 


                                        











 09/24/20 09/25/20 09/26/20





 06:59 06:59 06:59


 


Intake Total 2150 1000 


 


Output Total 3000  


 


Balance -850 1000 











Result Diagrams: 


                                 09/21/20 04:06





                                 09/21/20 04:06


Additional Labs: 


I reviewed patient's labs and MAR





Hospitalist ROS





- Review of Systems


Constitutional: reports: weakness.  denies: fever, malaise


Respiratory: reports: SOB with excertion.  denies: cough, shortness of breath, 

pleuritic pain, wheezing


Cardiovascular: denies: chest pain, palpitations, orthopnea, paroxysmal noc. 

dyspnea, edema, light headedness





- Medication


Medications: 


Active Medications











Generic Name Dose Route Start Last Admin





  Trade Name Freq  PRN Reason Stop Dose Admin


 


Albuterol/Ipratropium  3 ml  09/15/20 07:03  09/15/20 12:44





  Duoneb  NEB   3 ml





  [id number] PRN   Administration





  SOB &/or Wheezing  


 


Albuterol/Ipratropium  3 ml  09/18/20 10:30  09/25/20 12:57





  Ipratropium/Albuterol Sulfate 3 Ml Neb  NEB   3 ml





  [id number] LIDA   Administration


 


Amlodipine Besylate  10 mg  09/21/20 09:00  09/25/20 08:51





  Amlodipine 10 Mg Tab  PO   10 mg





  DAILY LIDA   Administration


 


Aspirin  325 mg  09/15/20 09:00  09/25/20 08:51





  Aspirin  PER TUBE   325 mg





  DAILY LIDA   Administration


 


Clonidine  0.1 mg  09/19/20 13:22  09/20/20 15:13





  Clonidine 0.1 Mg Tab  PO   0.1 mg





  Q4H PRN   Administration





  SBP Greater Than 180  


 


Doxycycline Hyclate  100 mg  09/21/20 21:00  09/25/20 08:52





  Doxycycline 100 Mg Cap  PO  09/28/20 21:01  100 mg





  BID LIDA   Administration


 


Enoxaparin Sodium  40 mg  09/15/20 09:00  09/25/20 08:52





  Lovenox  SC   40 mg





  0900 LIDA   Administration


 


Hydralazine HCl  25 mg  09/19/20 15:00  09/25/20 15:44





  Hydralazine 25 Mg Tab  PO   25 mg





  TID LIDA   Administration


 


Labetalol HCl  10 mg  09/19/20 08:19  09/19/20 20:45





  Labetalol Hcl 100 Mg/20 Ml Vial  SLOW IVP   10 mg





  Q4H PRN   Administration





  SBP Greater Than 180  


 


Lisinopril  5 mg  09/22/20 09:00  09/25/20 08:51





  Lisinopril 5 Mg Tab  PO   5 mg





  DAILY LIDA   Administration


 


Mometasone Furoate/Formoterol Fumar  2 puff  09/21/20 18:30  09/25/20 07:09





  Mometasone 200 Mcg/Formoterol 5 Mcg 120 Puff Inhaler  INH   2 puff





  BID-RT LIDA   Administration


 


Montelukast Sodium  10 mg  09/17/20 09:00  09/25/20 08:52





  Montelukast Sodium 10 Mg Tablet  PER TUBE   10 mg





  DAILY LIDA   Administration


 


Pantoprazole Sodium  40 mg  09/18/20 09:00  09/25/20 08:52





  Pantoprazole 40 Mg Granules Packet  PER TUBE   40 mg





  DAILY LIDA   Administration


 


Prednisone  40 mg  09/22/20 08:00  09/25/20 08:51





  Prednisone 20 Mg Tab  PO   40 mg





  QAM-WM LIDA   Administration














- Exam


General - other findings: Obese


Eye: anicteric sclera


ENT: moist mucosa


Neck: supple


Heart: RRR


Respiratory: CTAB


Gastrointestinal: soft, normal bowel sounds


Extremities: no cyanosis


Skin: no rashes


Musculoskeletal: no muscle wasting


Psychiatric: normal affect, normal behavior





Hosp A/P





- Plan








-Assessment





(1) Acute respiratory failure with hypoxia and hypercapnia


Code(s): J96.01 - ACUTE RESPIRATORY FAILURE WITH HYPOXIA; J96.02 - ACUTE 

RESPIRATORY FAILURE WITH HYPERCAPNIA   Status: Acute   





(2) COPD exacerbation


Code(s): J44.1 - CHRONIC OBSTRUCTIVE PULMONARY DISEASE W (ACUTE) EXACERBATION   

Status: Acute   





(3) Pneumothorax


Code(s): I50.9 - HEART FAILURE, UNSPECIFIED   Status: Acute   


Qualifiers: 


   Status: acute





(4) hypertensive urgency


Status: Resolved   

















- Plan





* Patient oxygenating well on room air.


* She is on oral antibiotics and steroids 


* Iatrogenic pneumothorax improved.


* Ambulate patient.


* Discharge planning in process.  Case management involved.

## 2020-09-26 NOTE — PRG
DATE OF SERVICE:  09/26/2020



SUBJECTIVE:  Andrés Bravo is in no distress.  She has no new complaints.  She

says she is feeling better. 



OBJECTIVE:  VITAL SIGNS:  She is afebrile.  Heart rates in the 70s, blood pressure

116/63, oximetry is 97% on room air. 

LUNGS:  Clear. 

HEART:  Regular rhythm. 

ABDOMEN:  Soft. 

EXTREMITIES:  Without edema.



IMPRESSION:  Chronic obstructive pulmonary disease exacerbation, slowly improving.



PLAN:  Continue same. 



It is anticipated she will either go to rehab or home if she gets strong enough.







Job ID:  [id number]

## 2020-09-26 NOTE — PDOC.HOSPP
- Subjective


Encounter Date: 09/26/20


Encounter Time: 11:45


Subjective: 


Patient seen and examined for respiratory failure.  Denies any worsening 

shortness of breath or cough.  No fever or chills reported.





- Objective


Vital Signs & Weight: 


                             Vital Signs (12 hours)











  Temp Pulse Resp BP BP Pulse Ox


 


 09/26/20 10:32   72  15   


 


 09/26/20 08:33   73   116/63  


 


 09/26/20 08:32   73   116/63  


 


 09/26/20 08:31   73   116/63  


 


 09/26/20 08:00       97


 


 09/26/20 07:55  98.3 F  73  20   116/63  97


 


 09/26/20 07:15       89 L


 


 09/26/20 07:12   71  15    98


 


 09/26/20 02:32   79  14    95








                                     Weight











Admit Weight                   277 lb 8.992 oz


 


Weight                         259 lb 7.745 oz











                            Most Recent Monitor Data











Heart Rate from ECG            73


 


NIBP                           133/75


 


NIBP BP-Mean                   94


 


Respiration from ECG           18


 


SpO2                           100














I&O: 


                                        











 09/25/20 09/26/20 09/27/20





 06:59 06:59 06:59


 


Intake Total 1000 400 


 


Output Total  650 


 


Balance 1000 -250 











Result Diagrams: 


                                 09/21/20 04:06





                                 09/21/20 04:06





Hospitalist ROS





- Review of Systems


Cardiovascular: denies: chest pain, palpitations, orthopnea, paroxysmal noc. 

dyspnea, edema, light headedness, other


Gastrointestinal: denies: nausea, vomiting, abdominal pain, diarrhea, 

constipation, melena, hematochezia, other





- Medication


Medications: 


Active Medications











Generic Name Dose Route Start Last Admin





  Trade Name Freq  PRN Reason Stop Dose Admin


 


Albuterol/Ipratropium  3 ml  09/15/20 07:03  09/15/20 12:44





  Duoneb  NEB   3 ml





  [id number] PRN   Administration





  SOB &/or Wheezing  


 


Albuterol/Ipratropium  3 ml  09/18/20 10:30  09/26/20 10:32





  Ipratropium/Albuterol Sulfate 3 Ml Neb  NEB   3 ml





  [id number] LIDA   Administration


 


Amlodipine Besylate  10 mg  09/21/20 09:00  09/26/20 08:31





  Amlodipine 10 Mg Tab  PO   10 mg





  DAILY LIDA   Administration


 


Aspirin  325 mg  09/15/20 09:00  09/26/20 08:32





  Aspirin  PER TUBE   325 mg





  DAILY LIDA   Administration


 


Clonidine  0.1 mg  09/19/20 13:22  09/20/20 15:13





  Clonidine 0.1 Mg Tab  PO   0.1 mg





  Q4H PRN   Administration





  SBP Greater Than 180  


 


Doxycycline Hyclate  100 mg  09/21/20 21:00  09/26/20 08:32





  Doxycycline 100 Mg Cap  PO  09/28/20 21:01  100 mg





  BID LIDA   Administration


 


Enoxaparin Sodium  40 mg  09/15/20 09:00  09/26/20 08:32





  Lovenox  SC   40 mg





  0900 LIDA   Administration


 


Hydralazine HCl  25 mg  09/19/20 15:00  09/26/20 08:32





  Hydralazine 25 Mg Tab  PO   Not Given





  TID LIDA  


 


Labetalol HCl  10 mg  09/19/20 08:19  09/19/20 20:45





  Labetalol Hcl 100 Mg/20 Ml Vial  SLOW IVP   10 mg





  Q4H PRN   Administration





  SBP Greater Than 180  


 


Lisinopril  5 mg  09/22/20 09:00  09/26/20 08:33





  Lisinopril 5 Mg Tab  PO   5 mg





  DAILY LIDA   Administration


 


Mometasone Furoate/Formoterol Fumar  2 puff  09/21/20 18:30  09/26/20 07:15





  Mometasone 200 Mcg/Formoterol 5 Mcg 120 Puff Inhaler  INH   2 puff





  BID-RT LIDA   Administration


 


Montelukast Sodium  10 mg  09/17/20 09:00  09/26/20 08:33





  Montelukast Sodium 10 Mg Tablet  PER TUBE   10 mg





  DAILY LIDA   Administration


 


Pantoprazole Sodium  40 mg  09/18/20 09:00  09/26/20 08:33





  Pantoprazole 40 Mg Granules Packet  PER TUBE   40 mg





  DAILY LIDA   Administration


 


Prednisone  40 mg  09/22/20 08:00  09/26/20 08:31





  Prednisone 20 Mg Tab  PO   40 mg





  QAM-WM LIDA   Administration














- Exam


General Appearance: NAD


Neck: supple, no JVD


Respiratory: no wheezes, no rales, rhonchi


Gastrointestinal: soft, no guarding, no rigidity


Extremities: 2+ LE edema


Neurological: no new deficit





Hosp A/P


(1) Acute respiratory failure with hypoxia and hypercapnia


Code(s): J96.01 - ACUTE RESPIRATORY FAILURE WITH HYPOXIA; J96.02 - ACUTE 

RESPIRATORY FAILURE WITH HYPERCAPNIA   Status: Acute   





(2) COPD exacerbation


Code(s): J44.1 - CHRONIC OBSTRUCTIVE PULMONARY DISEASE W (ACUTE) EXACERBATION   

Status: Acute   





(3) Spontaneous pneumothorax


Code(s): J93.83 - OTHER PNEUMOTHORAX   Status: Resolved   





(4) CAD (coronary artery disease)


Code(s): I25.10 - ATHSCL HEART DISEASE OF NATIVE CORONARY ARTERY W/O ANG PCTRS  

Status: Chronic   





(5) HTN (hypertension)


Code(s): I10 - ESSENTIAL (PRIMARY) HYPERTENSION   Status: Chronic   





(6) Obesity (BMI 30-39.9)


Code(s): E66.9 - OBESITY, UNSPECIFIED   Status: Acute   





(7) Other issues per previous note








- Plan


DVT proph w/lovenox





60-year-old female admitted on 15 September with respiratory failure requiring 

mechanical intubation.  She was also found to have spontaneous right-sided 

pneumothorax requiring chest tube placement.  She was subsequently extubated and

transferred to medical floor.  Will continue inhaled corticosteroid, oral 

prednisone, nebulizer treatment with oral doxycycline.  Continue lisinopril, 

hydralazine and amlodipine for hypertension.  Patient is awaiting placement at 

this time.  Update home medication list.  Recheck labs in a.m.

## 2020-09-27 NOTE — PDOC.HOSPP
- Subjective


Encounter Date: 09/27/20


Encounter Time: 14:30


Subjective: 


Patient seen and examined for respiratory failure.  Denies any new complaints.  

Mild cough without significant production.  No significant change in shortness 

of breath.





- Objective


Vital Signs & Weight: 


                             Vital Signs (12 hours)











  Temp Pulse Resp BP BP Pulse Ox


 


 09/27/20 17:52   74  16    95


 


 09/27/20 15:09   76   108/64  


 


 09/27/20 10:29   76  15   


 


 09/27/20 08:07   74   110/63  


 


 09/27/20 08:06   74   110/63  


 


 09/27/20 08:00       95


 


 09/27/20 07:50  98.0 F  74  20   110/63  95








                                     Weight











Admit Weight                   277 lb 8.992 oz


 


Weight                         259 lb 7.745 oz











                            Most Recent Monitor Data











Heart Rate from ECG            73


 


NIBP                           133/75


 


NIBP BP-Mean                   94


 


Respiration from ECG           18


 


SpO2                           100














I&O: 


                                        











 09/26/20 09/27/20 09/28/20





 06:59 06:59 06:59


 


Intake Total 400 970 


 


Output Total 650  


 


Balance -250 970 











Result Diagrams: 


                                 09/27/20 05:23





                                 09/27/20 05:23





Hospitalist ROS





- Review of Systems


Respiratory: denies: cough, dry, shortness of breath, hemoptysis, SOB with 

excertion, pleuritic pain, sputum, wheezing, other


Cardiovascular: denies: chest pain, palpitations, orthopnea, paroxysmal noc. 

dyspnea, edema, light headedness, other


Gastrointestinal: denies: nausea, vomiting, abdominal pain, diarrhea, 

constipation, melena, hematochezia, other





- Medication


Medications: 


Active Medications











Generic Name Dose Route Start Last Admin





  Trade Name Freq  PRN Reason Stop Dose Admin


 


Albuterol/Ipratropium  3 ml  09/15/20 07:03  09/15/20 12:44





  Duoneb  NEB   3 ml





  [id number] PRN   Administration





  SOB &/or Wheezing  


 


Albuterol/Ipratropium  3 ml  09/18/20 10:30  09/27/20 17:52





  Ipratropium/Albuterol Sulfate 3 Ml Neb  NEB   3 ml





  [id number] LIDA   Administration


 


Amlodipine Besylate  10 mg  09/21/20 09:00  09/27/20 08:06





  Amlodipine 10 Mg Tab  PO   10 mg





  DAILY LIDA   Administration


 


Aspirin  325 mg  09/15/20 09:00  09/27/20 08:06





  Aspirin  PER TUBE   325 mg





  DAILY LIDA   Administration


 


Clonidine  0.1 mg  09/19/20 13:22  09/20/20 15:13





  Clonidine 0.1 Mg Tab  PO   0.1 mg





  Q4H PRN   Administration





  SBP Greater Than 180  


 


Doxycycline Hyclate  100 mg  09/21/20 21:00  09/27/20 08:06





  Doxycycline 100 Mg Cap  PO  09/28/20 21:01  100 mg





  BID LIDA   Administration


 


Enoxaparin Sodium  40 mg  09/15/20 09:00  09/27/20 08:06





  Lovenox  SC   40 mg





  0900 LIDA   Administration


 


Hydralazine HCl  25 mg  09/19/20 15:00  09/27/20 15:09





  Hydralazine 25 Mg Tab  PO   Not Given





  TID LIDA  


 


Labetalol HCl  10 mg  09/19/20 08:19  09/19/20 20:45





  Labetalol Hcl 100 Mg/20 Ml Vial  SLOW IVP   10 mg





  Q4H PRN   Administration





  SBP Greater Than 180  


 


Lisinopril  5 mg  09/22/20 09:00  09/27/20 08:07





  Lisinopril 5 Mg Tab  PO   Not Given





  DAILY LIDA  


 


Mometasone Furoate/Formoterol Fumar  2 puff  09/21/20 18:30  09/27/20 18:01





  Mometasone 200 Mcg/Formoterol 5 Mcg 120 Puff Inhaler  INH   2 puff





  BID-RT LIDA   Administration


 


Montelukast Sodium  10 mg  09/17/20 09:00  09/27/20 08:07





  Montelukast Sodium 10 Mg Tablet  PER TUBE   10 mg





  DAILY LIDA   Administration


 


Pantoprazole Sodium  40 mg  09/18/20 09:00  09/27/20 08:07





  Pantoprazole 40 Mg Granules Packet  PER TUBE   40 mg





  DAILY LIDA   Administration


 


Prednisone  40 mg  09/22/20 08:00  09/27/20 08:05





  Prednisone 20 Mg Tab  PO   40 mg





  QAM-WM LIDA   Administration














- Exam


General Appearance: NAD


Neck: supple, no JVD


Heart: no gallops, no rubs


Respiratory: no wheezes, no ronchi, normal chest expansion


Gastrointestinal: non-tender, non-distended


Extremities: 2+ LE edema





Hosp A/P


(1) Acute respiratory failure with hypoxia and hypercapnia


Code(s): J96.01 - ACUTE RESPIRATORY FAILURE WITH HYPOXIA; J96.02 - ACUTE 

RESPIRATORY FAILURE WITH HYPERCAPNIA   Status: Acute   





(2) COPD exacerbation


Code(s): J44.1 - CHRONIC OBSTRUCTIVE PULMONARY DISEASE W (ACUTE) EXACERBATION   

Status: Acute   





(3) Spontaneous pneumothorax


Code(s): J93.83 - OTHER PNEUMOTHORAX   Status: Resolved   





(4) CAD (coronary artery disease)


Code(s): I25.10 - ATHSCL HEART DISEASE OF NATIVE CORONARY ARTERY W/O ANG PCTRS  

Status: Chronic   





(5) HTN (hypertension)


Code(s): I10 - ESSENTIAL (PRIMARY) HYPERTENSION   Status: Chronic   





(6) Obesity (BMI 30-39.9)


Code(s): E66.9 - OBESITY, UNSPECIFIED   Status: Acute   





(7) Other issues per previous note








- Plan


DVT proph w/lovenox, DVT proph w/SCDs





9/27


Continue nebulizer treatment with oral prednisone and doxycycline.  Home 

medications verified today.  Will change aspirin to Plavix.  Resume home dose of

Lasix.  Add potassium supplementation.  Continue physical therapy.  Leg 

elevation.








9/26


60-year-old female admitted on 15 September with respiratory failure requiring 

mechanical intubation.  She was also found to have spontaneous right-sided 

pneumothorax requiring chest tube placement.  She was subsequently extubated and

transferred to medical floor.  Will continue inhaled corticosteroid, oral 

prednisone, nebulizer treatment with oral doxycycline.  Continue lisinopril, 

hydralazine and amlodipine for hypertension.  Patient is awaiting placement at 

this time.  Update home medication list.  Recheck labs in a.m.

## 2020-09-27 NOTE — PRG
DATE OF SERVICE:  09/27/2020



SUBJECTIVE:  Alexa has no complaints.  She passed her swallowing evaluation completely

today. 



OBJECTIVE:  VITAL SIGNS:  She is afebrile.  Blood pressure 110/62, heart rate 74,

__________.  She is on room air. 

LUNGS:  Clear. 

HEART:  Regular rhythm. 

ABDOMEN:  Soft.



LABORATORY DATA:  White count 15.6, hemoglobin 14.1, platelets 269.  Electrolytes

normal.  Creatinine 0.59. 



IMPRESSION:  

1. Chronic obstructive pulmonary disease.

2. History of respiratory failure.

3. Spontaneous pneumothorax this admission.

4. Deconditioning.

5. Obesity. 

She appears stable.







Job ID:  [id number]

## 2020-09-28 NOTE — PRG
DATE OF SERVICE:  09/28/2020



SUBJECTIVE:  The patient is doing well.  She is reluctant to go over to rehab,

although I am not sure she would qualify anyway.  She says she is committed to stop

smoking.  She says she was on albuterol prior to admission, but was unable to afford

any of the medications because she has no insurance. 



OBJECTIVE:  VITAL SIGNS:  Her temperature is 97.8, pulse 77, respiration 20, O2

saturation 96% on room air, blood pressure 132/69. 

HEENT:  Unremarkable. 

NECK:  No adenopathy or JVD. 

LUNGS:  Clear. 

CARDIAC:  S1 and S2, regular. 

ABDOMEN:  Soft. 

EXTREMITIES:  No edema.



ASSESSMENT:  

1. Chronic obstructive pulmonary disease with exacerbation.

2. Status post respiratory failure, requiring mechanical ventilation.

3. Tobacco abuse.



PLAN:  In an ideal world, it would be nice to see her on a controlled medications

such as Advair, Symbicort or Dulera.  At the very least, she needs to be on

albuterol and ipratropium four times a day when discharged.  I will go ahead and cut

her prednisone down to 20 mg a day.  I would wean this over a week to 10 days.  She

is probably medically stable for discharge or whatever the next step in her care

will be. 







Job ID:  [id number]

## 2020-09-28 NOTE — DIS
DATE OF ADMISSION:  09/15/2020



DATE OF DISCHARGE:  09/28/2020



DISCHARGE DISPOSITION:  Skilled nursing facility.



DISCHARGE MEDICATIONS:  

1. Doxycycline 100 mg b.i.d. for next 5 days.

2. Prednisone taper.

3. Singulair 10 mg daily.

4. Lisinopril and other medications were left unchanged. 



The patient was seen and examined on the day of discharge.  Denies any new

complaints.  No new fever or chills reported. 



SIGNIFICANT LABORATORY DATA:  ABG on admission showed pH of 7.10 with pCO2 of 88.1,

bicarbonate of 26.4 with pO2 of 69.3. 



COVID-19 testing was negative. 



BNP was 724. 



Creatinine 0.97. 



Blood cultures were negative. 



Chest x-ray on admission showed pneumothorax requiring chest tube placement.



INPATIENT CONSULTANT:  Pulmonary/Critical Care.



BRIEF HOSPITAL COURSE:  The patient is a 60-year-old female with COPD, congestive

heart failure, morbid obesity, and coronary artery disease who was brought in to the

emergency room by EMS on 15th September with worsening shortness of breath along

with altered mentation.  She was minimally responsive to oxygen.  She was

subsequently intubated.  Her chest x-ray in the emergency room showed a large

right-sided pneumothorax requiring chest tube placement.  She also had a central

line placed.  She was started on antibiotics along with Levophed, steroids, and was

admitted to the ICU.  Please refer to the history and physical for further details. 



The patient was admitted to the hospital with a diagnosis of respiratory failure

secondary to COPD exacerbation with congestive heart failure exacerbation and

pneumothorax.  She was evaluated by Pulmonary/Critical Care.  She was kept on

oxygen, nebulizer treatment, steroids, and antibiotics.  Later on, the chest tube

was removed.  She was subsequently extubated and transferred to medical floor.  Her

antibiotics have been transitioned to oral.  Steroids were changed to oral as well.

The patient has been physically deconditioned and inpatient rehab versus skilled

nursing facility was recommended.  There was a delay in finding a skilled nursing

facility since the patient is uninsured.  The patient was accepted at a [facility] nursing

facility today on private pay.  She has been cleared by consultants for discharge.

Her vital signs on the day of discharge showed temperature 97.8, pulse rate of 77,

respirations of 20, blood pressure of 132/69 with O2 saturation 96% on room air.

The patient understands the above plan of care. 



FINAL DIAGNOSES:  

1. Acute hypoxic and hypercapnic respiratory failure.

2. Chronic obstructive pulmonary disease exacerbation.

3. Acute on chronic systolic and diastolic heart failure exacerbation, present on

admission. 

4. Coronary artery disease, status post recent stent placement.

5. Hypertension.

6. Spontaneous pneumothorax requiring chest tube.

7. Obesity with a body mass index of 39.



TIME SPENT:  Time coordinating the discharge of this patient was 35 minutes.







Job ID:  [id number]

## 2021-07-23 ENCOUNTER — HOSPITAL ENCOUNTER (INPATIENT)
Dept: HOSPITAL 92 - ERS | Age: 61
LOS: 54 days | Discharge: NURSING, REHAB OR LONG-TERM CARE | DRG: 207 | End: 2021-09-15
Attending: INTERNAL MEDICINE | Admitting: INTERNAL MEDICINE
Payer: SELF-PAY

## 2021-07-23 VITALS — BODY MASS INDEX: 55.3 KG/M2

## 2021-07-23 DIAGNOSIS — R91.1: ICD-10-CM

## 2021-07-23 DIAGNOSIS — Z79.52: ICD-10-CM

## 2021-07-23 DIAGNOSIS — I48.0: ICD-10-CM

## 2021-07-23 DIAGNOSIS — E87.3: ICD-10-CM

## 2021-07-23 DIAGNOSIS — Z72.89: ICD-10-CM

## 2021-07-23 DIAGNOSIS — Z79.82: ICD-10-CM

## 2021-07-23 DIAGNOSIS — E87.1: ICD-10-CM

## 2021-07-23 DIAGNOSIS — R00.0: ICD-10-CM

## 2021-07-23 DIAGNOSIS — Z20.822: ICD-10-CM

## 2021-07-23 DIAGNOSIS — I31.3: ICD-10-CM

## 2021-07-23 DIAGNOSIS — I11.0: ICD-10-CM

## 2021-07-23 DIAGNOSIS — E87.6: ICD-10-CM

## 2021-07-23 DIAGNOSIS — R00.1: ICD-10-CM

## 2021-07-23 DIAGNOSIS — F17.218: ICD-10-CM

## 2021-07-23 DIAGNOSIS — E66.01: ICD-10-CM

## 2021-07-23 DIAGNOSIS — K59.00: ICD-10-CM

## 2021-07-23 DIAGNOSIS — J96.01: ICD-10-CM

## 2021-07-23 DIAGNOSIS — Z78.1: ICD-10-CM

## 2021-07-23 DIAGNOSIS — N39.0: ICD-10-CM

## 2021-07-23 DIAGNOSIS — I25.2: ICD-10-CM

## 2021-07-23 DIAGNOSIS — Z79.51: ICD-10-CM

## 2021-07-23 DIAGNOSIS — I21.A1: ICD-10-CM

## 2021-07-23 DIAGNOSIS — J96.02: ICD-10-CM

## 2021-07-23 DIAGNOSIS — E78.5: ICD-10-CM

## 2021-07-23 DIAGNOSIS — I25.10: ICD-10-CM

## 2021-07-23 DIAGNOSIS — Z95.5: ICD-10-CM

## 2021-07-23 DIAGNOSIS — I50.43: ICD-10-CM

## 2021-07-23 DIAGNOSIS — D64.9: ICD-10-CM

## 2021-07-23 DIAGNOSIS — J44.1: Primary | ICD-10-CM

## 2021-07-23 DIAGNOSIS — R73.9: ICD-10-CM

## 2021-07-23 DIAGNOSIS — Z79.899: ICD-10-CM

## 2021-07-23 DIAGNOSIS — J69.0: ICD-10-CM

## 2021-07-23 LAB
ALBUMIN SERPL BCG-MCNC: 4 G/DL (ref 3.5–5)
ALP SERPL-CCNC: 71 U/L (ref 40–110)
ALT SERPL W P-5'-P-CCNC: 22 U/L (ref 8–55)
ANALYZER IN CARDIO: (no result)
ANION GAP SERPL CALC-SCNC: 13 MMOL/L (ref 10–20)
AST SERPL-CCNC: 18 U/L (ref 5–34)
BASE EXCESS STD BLDA CALC-SCNC: 0.6 MEQ/L
BASOPHILS # BLD AUTO: 0 THOU/UL (ref 0–0.2)
BASOPHILS NFR BLD AUTO: 0.2 % (ref 0–1)
BILIRUB SERPL-MCNC: 1 MG/DL (ref 0.2–1.2)
BUN SERPL-MCNC: 17 MG/DL (ref 9.8–20.1)
CA-I BLDA-SCNC: 1.17 MMOL/L (ref 1.12–1.3)
CALCIUM SERPL-MCNC: 9 MG/DL (ref 7.8–10.44)
CHLORIDE SERPL-SCNC: 102 MMOL/L (ref 98–107)
CK MB SERPL-MCNC: 1.6 NG/ML (ref 0–6.6)
CO2 SERPL-SCNC: 28 MMOL/L (ref 22–29)
CREAT CL PREDICTED SERPL C-G-VRATE: 0 ML/MIN (ref 70–130)
EOSINOPHIL # BLD AUTO: 0.2 THOU/UL (ref 0–0.7)
EOSINOPHIL NFR BLD AUTO: 1.2 % (ref 0–10)
GLOBULIN SER CALC-MCNC: 2.4 G/DL (ref 2.4–3.5)
GLUCOSE SERPL-MCNC: 224 MG/DL (ref 70–105)
HCO3 BLDA-SCNC: 29.1 MEQ/L (ref 22–28)
HCT VFR BLDA CALC: 39 % (ref 36–47)
HGB BLD-MCNC: 12.9 G/DL (ref 12–16)
HGB BLDA-MCNC: 13.3 G/DL (ref 12–16)
LYMPHOCYTES # BLD: 1.3 THOU/UL (ref 1.2–3.4)
LYMPHOCYTES NFR BLD AUTO: 8.5 % (ref 21–51)
MCH RBC QN AUTO: 27.4 PG (ref 27–31)
MCV RBC AUTO: 86.1 FL (ref 78–98)
MONOCYTES # BLD AUTO: 0.6 THOU/UL (ref 0.11–0.59)
MONOCYTES NFR BLD AUTO: 3.8 % (ref 0–10)
NEUTROPHILS # BLD AUTO: 13.3 THOU/UL (ref 1.4–6.5)
NEUTROPHILS NFR BLD AUTO: 86.4 % (ref 42–75)
PCO2 BLDA: 65.8 MMHG (ref 35–45)
PH BLDA: 7.26 [PH] (ref 7.35–7.45)
PLATELET # BLD AUTO: 225 THOU/UL (ref 130–400)
PO2 BLDA: 89.3 MMHG (ref 80–100)
POTASSIUM BLD-SCNC: 4.29 MMOL/L (ref 3.7–5.3)
POTASSIUM SERPL-SCNC: 4.3 MMOL/L (ref 3.5–5.1)
RBC # BLD AUTO: 4.68 MILL/UL (ref 4.2–5.4)
SODIUM SERPL-SCNC: 139 MMOL/L (ref 136–145)
SPECIMEN DRAWN FROM PATIENT: (no result)
TROPONIN I SERPL DL<=0.01 NG/ML-MCNC: 0.26 NG/ML (ref ?–0.03)
TROPONIN I SERPL DL<=0.01 NG/ML-MCNC: 0.31 NG/ML (ref ?–0.03)
WBC # BLD AUTO: 15.4 THOU/UL (ref 4.8–10.8)

## 2021-07-23 PROCEDURE — 80048 BASIC METABOLIC PNL TOTAL CA: CPT

## 2021-07-23 PROCEDURE — 81001 URINALYSIS AUTO W/SCOPE: CPT

## 2021-07-23 PROCEDURE — 83735 ASSAY OF MAGNESIUM: CPT

## 2021-07-23 PROCEDURE — 87077 CULTURE AEROBIC IDENTIFY: CPT

## 2021-07-23 PROCEDURE — 96367 TX/PROPH/DG ADDL SEQ IV INF: CPT

## 2021-07-23 PROCEDURE — 94640 AIRWAY INHALATION TREATMENT: CPT

## 2021-07-23 PROCEDURE — 87186 SC STD MICRODIL/AGAR DIL: CPT

## 2021-07-23 PROCEDURE — 82553 CREATINE MB FRACTION: CPT

## 2021-07-23 PROCEDURE — 51702 INSERT TEMP BLADDER CATH: CPT

## 2021-07-23 PROCEDURE — 99292 CRITICAL CARE ADDL 30 MIN: CPT

## 2021-07-23 PROCEDURE — 3E033XZ INTRODUCTION OF VASOPRESSOR INTO PERIPHERAL VEIN, PERCUTANEOUS APPROACH: ICD-10-PCS | Performed by: EMERGENCY MEDICINE

## 2021-07-23 PROCEDURE — 96374 THER/PROPH/DIAG INJ IV PUSH: CPT

## 2021-07-23 PROCEDURE — 83036 HEMOGLOBIN GLYCOSYLATED A1C: CPT

## 2021-07-23 PROCEDURE — 0240U: CPT

## 2021-07-23 PROCEDURE — 80053 COMPREHEN METABOLIC PANEL: CPT

## 2021-07-23 PROCEDURE — 93306 TTE W/DOPPLER COMPLETE: CPT

## 2021-07-23 PROCEDURE — 93010 ELECTROCARDIOGRAM REPORT: CPT

## 2021-07-23 PROCEDURE — 94002 VENT MGMT INPAT INIT DAY: CPT

## 2021-07-23 PROCEDURE — 87040 BLOOD CULTURE FOR BACTERIA: CPT

## 2021-07-23 PROCEDURE — 96366 THER/PROPH/DIAG IV INF ADDON: CPT

## 2021-07-23 PROCEDURE — 94003 VENT MGMT INPAT SUBQ DAY: CPT

## 2021-07-23 PROCEDURE — C9113 INJ PANTOPRAZOLE SODIUM, VIA: HCPCS

## 2021-07-23 PROCEDURE — 96375 TX/PRO/DX INJ NEW DRUG ADDON: CPT

## 2021-07-23 PROCEDURE — 82805 BLOOD GASES W/O2 SATURATION: CPT

## 2021-07-23 PROCEDURE — 36416 COLLJ CAPILLARY BLOOD SPEC: CPT

## 2021-07-23 PROCEDURE — 36415 COLL VENOUS BLD VENIPUNCTURE: CPT

## 2021-07-23 PROCEDURE — 87070 CULTURE OTHR SPECIMN AEROBIC: CPT

## 2021-07-23 PROCEDURE — 96365 THER/PROPH/DIAG IV INF INIT: CPT

## 2021-07-23 PROCEDURE — 36556 INSERT NON-TUNNEL CV CATH: CPT

## 2021-07-23 PROCEDURE — 87205 SMEAR GRAM STAIN: CPT

## 2021-07-23 PROCEDURE — 83605 ASSAY OF LACTIC ACID: CPT

## 2021-07-23 PROCEDURE — 87086 URINE CULTURE/COLONY COUNT: CPT

## 2021-07-23 PROCEDURE — 5A1955Z RESPIRATORY VENTILATION, GREATER THAN 96 CONSECUTIVE HOURS: ICD-10-PCS | Performed by: EMERGENCY MEDICINE

## 2021-07-23 PROCEDURE — 83880 ASSAY OF NATRIURETIC PEPTIDE: CPT

## 2021-07-23 PROCEDURE — U0005 INFEC AGEN DETEC AMPLI PROBE: HCPCS

## 2021-07-23 PROCEDURE — 71275 CT ANGIOGRAPHY CHEST: CPT

## 2021-07-23 PROCEDURE — 71045 X-RAY EXAM CHEST 1 VIEW: CPT

## 2021-07-23 PROCEDURE — 84484 ASSAY OF TROPONIN QUANT: CPT

## 2021-07-23 PROCEDURE — B548ZZA ULTRASONOGRAPHY OF SUPERIOR VENA CAVA, GUIDANCE: ICD-10-PCS | Performed by: EMERGENCY MEDICINE

## 2021-07-23 PROCEDURE — 85025 COMPLETE CBC W/AUTO DIFF WBC: CPT

## 2021-07-23 PROCEDURE — 36600 WITHDRAWAL OF ARTERIAL BLOOD: CPT

## 2021-07-23 PROCEDURE — 02HV33Z INSERTION OF INFUSION DEVICE INTO SUPERIOR VENA CAVA, PERCUTANEOUS APPROACH: ICD-10-PCS | Performed by: EMERGENCY MEDICINE

## 2021-07-23 PROCEDURE — 93005 ELECTROCARDIOGRAM TRACING: CPT

## 2021-07-23 PROCEDURE — 0D9670Z DRAINAGE OF STOMACH WITH DRAINAGE DEVICE, VIA NATURAL OR ARTIFICIAL OPENING: ICD-10-PCS | Performed by: EMERGENCY MEDICINE

## 2021-07-23 PROCEDURE — 94660 CPAP INITIATION&MGMT: CPT

## 2021-07-23 PROCEDURE — U0003 INFECTIOUS AGENT DETECTION BY NUCLEIC ACID (DNA OR RNA); SEVERE ACUTE RESPIRATORY SYNDROME CORONAVIRUS 2 (SARS-COV-2) (CORONAVIRUS DISEASE [COVID-19]), AMPLIFIED PROBE TECHNIQUE, MAKING USE OF HIGH THROUGHPUT TECHNOLOGIES AS DESCRIBED BY CMS-2020-01-R: HCPCS

## 2021-07-23 RX ADMIN — AZITHROMYCIN SCH MLS: 500 INJECTION, POWDER, LYOPHILIZED, FOR SOLUTION INTRAVENOUS at 21:30

## 2021-07-24 LAB
ANION GAP SERPL CALC-SCNC: 13 MMOL/L (ref 10–20)
BASOPHILS # BLD AUTO: 0 THOU/UL (ref 0–0.2)
BASOPHILS NFR BLD AUTO: 0.1 % (ref 0–1)
BUN SERPL-MCNC: 15 MG/DL (ref 9.8–20.1)
CALCIUM SERPL-MCNC: 9.1 MG/DL (ref 7.8–10.44)
CHLORIDE SERPL-SCNC: 104 MMOL/L (ref 98–107)
CO2 SERPL-SCNC: 26 MMOL/L (ref 22–29)
CREAT CL PREDICTED SERPL C-G-VRATE: 188 ML/MIN (ref 70–130)
EOSINOPHIL # BLD AUTO: 0 THOU/UL (ref 0–0.7)
EOSINOPHIL NFR BLD AUTO: 0.1 % (ref 0–10)
GLUCOSE SERPL-MCNC: 155 MG/DL (ref 70–105)
HGB BLD-MCNC: 12.8 G/DL (ref 12–16)
LYMPHOCYTES # BLD: 1.1 THOU/UL (ref 1.2–3.4)
LYMPHOCYTES NFR BLD AUTO: 13.4 % (ref 21–51)
MCH RBC QN AUTO: 27.3 PG (ref 27–31)
MCV RBC AUTO: 86.2 FL (ref 78–98)
MONOCYTES # BLD AUTO: 0.1 THOU/UL (ref 0.11–0.59)
MONOCYTES NFR BLD AUTO: 1.1 % (ref 0–10)
NEUTROPHILS # BLD AUTO: 7.1 THOU/UL (ref 1.4–6.5)
NEUTROPHILS NFR BLD AUTO: 85.3 % (ref 42–75)
PLATELET # BLD AUTO: 197 THOU/UL (ref 130–400)
POTASSIUM SERPL-SCNC: 4.2 MMOL/L (ref 3.5–5.1)
RBC # BLD AUTO: 4.68 MILL/UL (ref 4.2–5.4)
SODIUM SERPL-SCNC: 139 MMOL/L (ref 136–145)
WBC # BLD AUTO: 8.3 THOU/UL (ref 4.8–10.8)

## 2021-07-24 RX ADMIN — CLINDAMYCIN PHOSPHATE SCH MLS: 600 INJECTION, SOLUTION INTRAVENOUS at 16:53

## 2021-07-24 RX ADMIN — Medication SCH MLS: at 00:25

## 2021-07-24 RX ADMIN — CEFTRIAXONE SCH MLS: 1 INJECTION, POWDER, FOR SOLUTION INTRAMUSCULAR; INTRAVENOUS at 16:52

## 2021-07-24 RX ADMIN — AZITHROMYCIN SCH MLS: 500 INJECTION, POWDER, LYOPHILIZED, FOR SOLUTION INTRAVENOUS at 21:02

## 2021-07-25 LAB
ANALYZER IN CARDIO: (no result)
BASE EXCESS STD BLDA CALC-SCNC: 3.6 MEQ/L
CA-I BLDA-SCNC: 1.21 MMOL/L (ref 1.12–1.3)
HCO3 BLDA-SCNC: 26.8 MEQ/L (ref 22–28)
HCT VFR BLDA CALC: 41 % (ref 36–47)
HGB BLDA-MCNC: 14.1 G/DL (ref 12–16)
PCO2 BLDA: 36.2 MMHG (ref 35–45)
PH BLDA: 7.49 [PH] (ref 7.35–7.45)
PO2 BLDA: 84.1 MMHG (ref 80–100)
POTASSIUM BLD-SCNC: 3.83 MMOL/L (ref 3.7–5.3)
SPECIMEN DRAWN FROM PATIENT: (no result)

## 2021-07-25 RX ADMIN — CLINDAMYCIN PHOSPHATE SCH MLS: 600 INJECTION, SOLUTION INTRAVENOUS at 02:24

## 2021-07-25 RX ADMIN — AZITHROMYCIN SCH MLS: 500 INJECTION, POWDER, LYOPHILIZED, FOR SOLUTION INTRAVENOUS at 21:01

## 2021-07-25 RX ADMIN — Medication SCH MLS: at 03:21

## 2021-07-25 RX ADMIN — CEFTRIAXONE SCH MLS: 1 INJECTION, POWDER, FOR SOLUTION INTRAMUSCULAR; INTRAVENOUS at 16:08

## 2021-07-25 RX ADMIN — ASPIRIN SCH MG: 81 TABLET ORAL at 09:02

## 2021-07-25 RX ADMIN — Medication SCH ML: at 20:51

## 2021-07-25 RX ADMIN — CLINDAMYCIN PHOSPHATE SCH MLS: 600 INJECTION, SOLUTION INTRAVENOUS at 17:27

## 2021-07-25 RX ADMIN — CLINDAMYCIN PHOSPHATE SCH MLS: 600 INJECTION, SOLUTION INTRAVENOUS at 09:28

## 2021-07-26 LAB
ANALYZER IN CARDIO: (no result)
BASE EXCESS STD BLDA CALC-SCNC: 2.8 MEQ/L
BASOPHILS # BLD AUTO: 0 THOU/UL (ref 0–0.2)
BASOPHILS NFR BLD AUTO: 0.1 % (ref 0–1)
CA-I BLDA-SCNC: 1.18 MMOL/L (ref 1.12–1.3)
EOSINOPHIL # BLD AUTO: 0 THOU/UL (ref 0–0.7)
EOSINOPHIL NFR BLD AUTO: 0.3 % (ref 0–10)
HCO3 BLDA-SCNC: 27.4 MEQ/L (ref 22–28)
HCT VFR BLDA CALC: 37 % (ref 36–47)
HGB BLD-MCNC: 11.6 G/DL (ref 12–16)
HGB BLDA-MCNC: 12.7 G/DL (ref 12–16)
LYMPHOCYTES # BLD: 1.6 THOU/UL (ref 1.2–3.4)
LYMPHOCYTES NFR BLD AUTO: 16.4 % (ref 21–51)
MCH RBC QN AUTO: 27.7 PG (ref 27–31)
MCV RBC AUTO: 85.4 FL (ref 78–98)
MONOCYTES # BLD AUTO: 0.7 THOU/UL (ref 0.11–0.59)
MONOCYTES NFR BLD AUTO: 7.5 % (ref 0–10)
NEUTROPHILS # BLD AUTO: 7.5 THOU/UL (ref 1.4–6.5)
NEUTROPHILS NFR BLD AUTO: 75.7 % (ref 42–75)
PCO2 BLDA: 42.2 MMHG (ref 35–45)
PH BLDA: 7.43 [PH] (ref 7.35–7.45)
PLATELET # BLD AUTO: 187 THOU/UL (ref 130–400)
PO2 BLDA: 75.7 MMHG (ref 80–100)
POTASSIUM BLD-SCNC: 3.48 MMOL/L (ref 3.7–5.3)
RBC # BLD AUTO: 4.18 MILL/UL (ref 4.2–5.4)
SPECIMEN DRAWN FROM PATIENT: (no result)
WBC # BLD AUTO: 9.9 THOU/UL (ref 4.8–10.8)

## 2021-07-26 RX ADMIN — Medication SCH MLS: at 17:46

## 2021-07-26 RX ADMIN — AZITHROMYCIN SCH MLS: 500 INJECTION, POWDER, LYOPHILIZED, FOR SOLUTION INTRAVENOUS at 21:22

## 2021-07-26 RX ADMIN — CLINDAMYCIN PHOSPHATE SCH MLS: 600 INJECTION, SOLUTION INTRAVENOUS at 19:42

## 2021-07-26 RX ADMIN — Medication SCH ML: at 10:24

## 2021-07-26 RX ADMIN — Medication SCH MLS: at 06:06

## 2021-07-26 RX ADMIN — CLINDAMYCIN PHOSPHATE SCH MLS: 600 INJECTION, SOLUTION INTRAVENOUS at 10:23

## 2021-07-26 RX ADMIN — ASPIRIN SCH MG: 81 TABLET ORAL at 10:24

## 2021-07-26 RX ADMIN — CLINDAMYCIN PHOSPHATE SCH MLS: 600 INJECTION, SOLUTION INTRAVENOUS at 04:04

## 2021-07-26 RX ADMIN — INSULIN LISPRO PRN UNIT: 100 INJECTION, SOLUTION INTRAVENOUS; SUBCUTANEOUS at 23:21

## 2021-07-26 RX ADMIN — Medication SCH ML: at 21:22

## 2021-07-27 LAB
ALBUMIN SERPL BCG-MCNC: 3.7 G/DL (ref 3.5–5)
ALP SERPL-CCNC: 61 U/L (ref 40–110)
ALT SERPL W P-5'-P-CCNC: 11 U/L (ref 8–55)
ANALYZER IN CARDIO: (no result)
ANION GAP SERPL CALC-SCNC: 12 MMOL/L (ref 10–20)
AST SERPL-CCNC: 8 U/L (ref 5–34)
BASE EXCESS STD BLDA CALC-SCNC: 2.6 MEQ/L
BASOPHILS # BLD AUTO: 0 THOU/UL (ref 0–0.2)
BASOPHILS NFR BLD AUTO: 0.1 % (ref 0–1)
BILIRUB SERPL-MCNC: 0.6 MG/DL (ref 0.2–1.2)
BUN SERPL-MCNC: 17 MG/DL (ref 9.8–20.1)
CA-I BLDA-SCNC: 1.24 MMOL/L (ref 1.12–1.3)
CALCIUM SERPL-MCNC: 9.3 MG/DL (ref 7.8–10.44)
CHLORIDE SERPL-SCNC: 104 MMOL/L (ref 98–107)
CO2 SERPL-SCNC: 27 MMOL/L (ref 22–29)
CREAT CL PREDICTED SERPL C-G-VRATE: 184 ML/MIN (ref 70–130)
EOSINOPHIL # BLD AUTO: 0 THOU/UL (ref 0–0.7)
EOSINOPHIL NFR BLD AUTO: 0.2 % (ref 0–10)
GLOBULIN SER CALC-MCNC: 2.9 G/DL (ref 2.4–3.5)
GLUCOSE SERPL-MCNC: 182 MG/DL (ref 70–105)
HCO3 BLDA-SCNC: 27.6 MEQ/L (ref 22–28)
HCT VFR BLDA CALC: 41 % (ref 36–47)
HGB BLD-MCNC: 12.8 G/DL (ref 12–16)
HGB BLDA-MCNC: 14.1 G/DL (ref 12–16)
LYMPHOCYTES # BLD: 0.8 THOU/UL (ref 1.2–3.4)
LYMPHOCYTES NFR BLD AUTO: 8.8 % (ref 21–51)
MCH RBC QN AUTO: 27.2 PG (ref 27–31)
MCV RBC AUTO: 85.2 FL (ref 78–98)
MONOCYTES # BLD AUTO: 0.1 THOU/UL (ref 0.11–0.59)
MONOCYTES NFR BLD AUTO: 1.4 % (ref 0–10)
NEUTROPHILS # BLD AUTO: 8 THOU/UL (ref 1.4–6.5)
NEUTROPHILS NFR BLD AUTO: 89.6 % (ref 42–75)
PCO2 BLDA: 44.2 MMHG (ref 35–45)
PH BLDA: 7.41 [PH] (ref 7.35–7.45)
PLATELET # BLD AUTO: 195 THOU/UL (ref 130–400)
PO2 BLDA: 84.7 MMHG (ref 80–100)
POTASSIUM BLD-SCNC: 3.98 MMOL/L (ref 3.7–5.3)
POTASSIUM SERPL-SCNC: 3.7 MMOL/L (ref 3.5–5.1)
RBC # BLD AUTO: 4.71 MILL/UL (ref 4.2–5.4)
SODIUM SERPL-SCNC: 139 MMOL/L (ref 136–145)
SPECIMEN DRAWN FROM PATIENT: (no result)
WBC # BLD AUTO: 8.9 THOU/UL (ref 4.8–10.8)

## 2021-07-27 RX ADMIN — CLINDAMYCIN PHOSPHATE SCH MLS: 600 INJECTION, SOLUTION INTRAVENOUS at 01:55

## 2021-07-27 RX ADMIN — Medication SCH MLS: at 06:19

## 2021-07-27 RX ADMIN — INSULIN LISPRO PRN UNIT: 100 INJECTION, SOLUTION INTRAVENOUS; SUBCUTANEOUS at 18:05

## 2021-07-27 RX ADMIN — Medication SCH MLS: at 19:04

## 2021-07-27 RX ADMIN — Medication SCH ML: at 08:51

## 2021-07-27 RX ADMIN — INSULIN LISPRO PRN UNIT: 100 INJECTION, SOLUTION INTRAVENOUS; SUBCUTANEOUS at 05:22

## 2021-07-27 RX ADMIN — ASPIRIN SCH MG: 81 TABLET ORAL at 08:50

## 2021-07-27 RX ADMIN — Medication SCH ML: at 20:22

## 2021-07-28 LAB
ANALYZER IN CARDIO: (no result)
ANION GAP SERPL CALC-SCNC: 8 MMOL/L (ref 10–20)
BASE EXCESS STD BLDA CALC-SCNC: 4.4 MEQ/L
BASOPHILS # BLD AUTO: 0 THOU/UL (ref 0–0.2)
BASOPHILS NFR BLD AUTO: 0.1 % (ref 0–1)
BUN SERPL-MCNC: 22 MG/DL (ref 9.8–20.1)
CA-I BLDA-SCNC: 1.27 MMOL/L (ref 1.12–1.3)
CALCIUM SERPL-MCNC: 9.2 MG/DL (ref 7.8–10.44)
CHLORIDE SERPL-SCNC: 103 MMOL/L (ref 98–107)
CO2 SERPL-SCNC: 33 MMOL/L (ref 22–29)
CREAT CL PREDICTED SERPL C-G-VRATE: 193 ML/MIN (ref 70–130)
EOSINOPHIL # BLD AUTO: 0 THOU/UL (ref 0–0.7)
EOSINOPHIL NFR BLD AUTO: 0.1 % (ref 0–10)
GLUCOSE SERPL-MCNC: 157 MG/DL (ref 70–105)
HCO3 BLDA-SCNC: 31.6 MEQ/L (ref 22–28)
HCT VFR BLDA CALC: 40 % (ref 36–47)
HGB BLD-MCNC: 12.7 G/DL (ref 12–16)
HGB BLDA-MCNC: 13.7 G/DL (ref 12–16)
LYMPHOCYTES # BLD: 0.9 THOU/UL (ref 1.2–3.4)
LYMPHOCYTES NFR BLD AUTO: 8.5 % (ref 21–51)
MCH RBC QN AUTO: 27.3 PG (ref 27–31)
MCV RBC AUTO: 87.1 FL (ref 78–98)
MONOCYTES # BLD AUTO: 0.3 THOU/UL (ref 0.11–0.59)
MONOCYTES NFR BLD AUTO: 3.2 % (ref 0–10)
NEUTROPHILS # BLD AUTO: 8.9 THOU/UL (ref 1.4–6.5)
NEUTROPHILS NFR BLD AUTO: 88 % (ref 42–75)
PCO2 BLDA: 58.2 MMHG (ref 35–45)
PH BLDA: 7.35 [PH] (ref 7.35–7.45)
PLATELET # BLD AUTO: 205 THOU/UL (ref 130–400)
PO2 BLDA: 125.2 MMHG (ref 80–100)
POTASSIUM BLD-SCNC: 4.2 MMOL/L (ref 3.7–5.3)
POTASSIUM SERPL-SCNC: 4.1 MMOL/L (ref 3.5–5.1)
RBC # BLD AUTO: 4.65 MILL/UL (ref 4.2–5.4)
SODIUM SERPL-SCNC: 140 MMOL/L (ref 136–145)
SPECIMEN DRAWN FROM PATIENT: (no result)
WBC # BLD AUTO: 10.1 THOU/UL (ref 4.8–10.8)

## 2021-07-28 RX ADMIN — Medication SCH MLS: at 19:10

## 2021-07-28 RX ADMIN — ASPIRIN SCH MG: 81 TABLET ORAL at 08:36

## 2021-07-28 RX ADMIN — INSULIN LISPRO PRN UNIT: 100 INJECTION, SOLUTION INTRAVENOUS; SUBCUTANEOUS at 17:38

## 2021-07-28 RX ADMIN — Medication SCH ML: at 08:45

## 2021-07-28 RX ADMIN — Medication SCH MLS: at 06:55

## 2021-07-28 RX ADMIN — Medication SCH ML: at 20:49

## 2021-07-28 RX ADMIN — INSULIN LISPRO PRN UNIT: 100 INJECTION, SOLUTION INTRAVENOUS; SUBCUTANEOUS at 22:15

## 2021-07-29 LAB
ANALYZER IN CARDIO: (no result)
ANION GAP SERPL CALC-SCNC: 8 MMOL/L (ref 10–20)
BASE EXCESS STD BLDA CALC-SCNC: 4.8 MEQ/L
BASOPHILS # BLD AUTO: 0 THOU/UL (ref 0–0.2)
BASOPHILS NFR BLD AUTO: 0 % (ref 0–1)
BUN SERPL-MCNC: 29 MG/DL (ref 9.8–20.1)
CA-I BLDA-SCNC: 1.32 MMOL/L (ref 1.12–1.3)
CALCIUM SERPL-MCNC: 9.6 MG/DL (ref 7.8–10.44)
CHLORIDE SERPL-SCNC: 105 MMOL/L (ref 98–107)
CO2 SERPL-SCNC: 34 MMOL/L (ref 22–29)
CREAT CL PREDICTED SERPL C-G-VRATE: 191 ML/MIN (ref 70–130)
EOSINOPHIL # BLD AUTO: 0 THOU/UL (ref 0–0.7)
EOSINOPHIL NFR BLD AUTO: 0.1 % (ref 0–10)
GLUCOSE SERPL-MCNC: 145 MG/DL (ref 70–105)
HCO3 BLDA-SCNC: 32.6 MEQ/L (ref 22–28)
HCT VFR BLDA CALC: 41 % (ref 36–47)
HGB BLD-MCNC: 13.1 G/DL (ref 12–16)
HGB BLDA-MCNC: 14.1 G/DL (ref 12–16)
LYMPHOCYTES # BLD: 0.8 THOU/UL (ref 1.2–3.4)
LYMPHOCYTES NFR BLD AUTO: 7.9 % (ref 21–51)
MCH RBC QN AUTO: 27.4 PG (ref 27–31)
MCV RBC AUTO: 88.3 FL (ref 78–98)
MONOCYTES # BLD AUTO: 0.5 THOU/UL (ref 0.11–0.59)
MONOCYTES NFR BLD AUTO: 5.2 % (ref 0–10)
NEUTROPHILS # BLD AUTO: 9 THOU/UL (ref 1.4–6.5)
NEUTROPHILS NFR BLD AUTO: 86.7 % (ref 42–75)
O2 A-A PPRESDIFF RESPIRATORY: 130.47 MMHG (ref 0–20)
PCO2 BLDA: 62.9 MMHG (ref 35–45)
PH BLDA: 7.33 [PH] (ref 7.35–7.45)
PLATELET # BLD AUTO: 217 THOU/UL (ref 130–400)
PO2 BLDA: 76.1 MMHG (ref 80–100)
POTASSIUM BLD-SCNC: 4.57 MMOL/L (ref 3.7–5.3)
POTASSIUM SERPL-SCNC: 4.5 MMOL/L (ref 3.5–5.1)
RBC # BLD AUTO: 4.78 MILL/UL (ref 4.2–5.4)
SODIUM SERPL-SCNC: 142 MMOL/L (ref 136–145)
SPECIMEN DRAWN FROM PATIENT: (no result)
WBC # BLD AUTO: 10.3 THOU/UL (ref 4.8–10.8)

## 2021-07-29 RX ADMIN — INSULIN LISPRO PRN UNIT: 100 INJECTION, SOLUTION INTRAVENOUS; SUBCUTANEOUS at 17:48

## 2021-07-29 RX ADMIN — INSULIN LISPRO PRN UNIT: 100 INJECTION, SOLUTION INTRAVENOUS; SUBCUTANEOUS at 22:22

## 2021-07-29 RX ADMIN — ASPIRIN SCH MG: 81 TABLET ORAL at 09:47

## 2021-07-29 RX ADMIN — Medication SCH ML: at 20:01

## 2021-07-29 RX ADMIN — Medication SCH ML: at 09:52

## 2021-07-29 RX ADMIN — Medication SCH MLS: at 19:14

## 2021-07-30 LAB
ANALYZER IN CARDIO: (no result)
ANION GAP SERPL CALC-SCNC: 9 MMOL/L (ref 10–20)
BASE EXCESS STD BLDA CALC-SCNC: 2.9 MEQ/L
BASOPHILS # BLD AUTO: 0 THOU/UL (ref 0–0.2)
BASOPHILS NFR BLD AUTO: 0.1 % (ref 0–1)
BUN SERPL-MCNC: 33 MG/DL (ref 9.8–20.1)
CA-I BLDA-SCNC: 1.33 MMOL/L (ref 1.12–1.3)
CALCIUM SERPL-MCNC: 9.4 MG/DL (ref 7.8–10.44)
CHLORIDE SERPL-SCNC: 108 MMOL/L (ref 98–107)
CO2 SERPL-SCNC: 32 MMOL/L (ref 22–29)
CREAT CL PREDICTED SERPL C-G-VRATE: 187 ML/MIN (ref 70–130)
EOSINOPHIL # BLD AUTO: 0 THOU/UL (ref 0–0.7)
EOSINOPHIL NFR BLD AUTO: 0.1 % (ref 0–10)
GLUCOSE SERPL-MCNC: 160 MG/DL (ref 70–105)
HCO3 BLDA-SCNC: 29.9 MEQ/L (ref 22–28)
HCT VFR BLDA CALC: 41 % (ref 36–47)
HGB BLD-MCNC: 13.2 G/DL (ref 12–16)
HGB BLDA-MCNC: 14 G/DL (ref 12–16)
LYMPHOCYTES # BLD: 0.9 THOU/UL (ref 1.2–3.4)
LYMPHOCYTES NFR BLD AUTO: 8.5 % (ref 21–51)
MCH RBC QN AUTO: 27.9 PG (ref 27–31)
MCV RBC AUTO: 88.4 FL (ref 78–98)
MONOCYTES # BLD AUTO: 0.6 THOU/UL (ref 0.11–0.59)
MONOCYTES NFR BLD AUTO: 5.5 % (ref 0–10)
NEUTROPHILS # BLD AUTO: 9.3 THOU/UL (ref 1.4–6.5)
NEUTROPHILS NFR BLD AUTO: 85.8 % (ref 42–75)
O2 A-A PPRESDIFF RESPIRATORY: 147.68 MMHG (ref 0–20)
PCO2 BLDA: 56.1 MMHG (ref 35–45)
PH BLDA: 7.35 [PH] (ref 7.35–7.45)
PLATELET # BLD AUTO: 223 THOU/UL (ref 130–400)
PO2 BLDA: 67.4 MMHG (ref 80–100)
POTASSIUM BLD-SCNC: 4.46 MMOL/L (ref 3.7–5.3)
POTASSIUM SERPL-SCNC: 4.7 MMOL/L (ref 3.5–5.1)
RBC # BLD AUTO: 4.72 MILL/UL (ref 4.2–5.4)
SODIUM SERPL-SCNC: 144 MMOL/L (ref 136–145)
SPECIMEN DRAWN FROM PATIENT: (no result)
WBC # BLD AUTO: 10.8 THOU/UL (ref 4.8–10.8)

## 2021-07-30 RX ADMIN — Medication SCH MLS: at 08:38

## 2021-07-30 RX ADMIN — INSULIN LISPRO PRN UNIT: 100 INJECTION, SOLUTION INTRAVENOUS; SUBCUTANEOUS at 22:22

## 2021-07-30 RX ADMIN — ASPIRIN 81 MG CHEWABLE TABLET SCH MG: 81 TABLET CHEWABLE at 08:11

## 2021-07-30 RX ADMIN — INSULIN LISPRO PRN UNIT: 100 INJECTION, SOLUTION INTRAVENOUS; SUBCUTANEOUS at 03:37

## 2021-07-30 RX ADMIN — INSULIN LISPRO PRN UNIT: 100 INJECTION, SOLUTION INTRAVENOUS; SUBCUTANEOUS at 16:12

## 2021-07-30 RX ADMIN — INSULIN LISPRO PRN UNIT: 100 INJECTION, SOLUTION INTRAVENOUS; SUBCUTANEOUS at 10:12

## 2021-07-30 RX ADMIN — Medication SCH ML: at 21:54

## 2021-07-30 RX ADMIN — Medication SCH ML: at 09:00

## 2021-07-31 LAB
ANALYZER IN CARDIO: (no result)
ANION GAP SERPL CALC-SCNC: 8 MMOL/L (ref 10–20)
BASE EXCESS STD BLDA CALC-SCNC: 1.3 MEQ/L
BASOPHILS # BLD AUTO: 0 THOU/UL (ref 0–0.2)
BASOPHILS NFR BLD AUTO: 0 % (ref 0–1)
BUN SERPL-MCNC: 33 MG/DL (ref 9.8–20.1)
CA-I BLDA-SCNC: 1.34 MMOL/L (ref 1.12–1.3)
CALCIUM SERPL-MCNC: 9.5 MG/DL (ref 7.8–10.44)
CHLORIDE SERPL-SCNC: 106 MMOL/L (ref 98–107)
CO2 SERPL-SCNC: 32 MMOL/L (ref 22–29)
CREAT CL PREDICTED SERPL C-G-VRATE: 209 ML/MIN (ref 70–130)
EOSINOPHIL # BLD AUTO: 0 THOU/UL (ref 0–0.7)
EOSINOPHIL NFR BLD AUTO: 0.1 % (ref 0–10)
GLUCOSE SERPL-MCNC: 170 MG/DL (ref 70–105)
HCO3 BLDA-SCNC: 28.9 MEQ/L (ref 22–28)
HCT VFR BLDA CALC: 45 % (ref 36–47)
HGB BLD-MCNC: 13.7 G/DL (ref 12–16)
HGB BLDA-MCNC: 15.2 G/DL (ref 12–16)
LYMPHOCYTES # BLD: 1 THOU/UL (ref 1.2–3.4)
LYMPHOCYTES NFR BLD AUTO: 7.6 % (ref 21–51)
MCH RBC QN AUTO: 28.3 PG (ref 27–31)
MCV RBC AUTO: 88.8 FL (ref 78–98)
MONOCYTES # BLD AUTO: 0.8 THOU/UL (ref 0.11–0.59)
MONOCYTES NFR BLD AUTO: 6.3 % (ref 0–10)
NEUTROPHILS # BLD AUTO: 10.8 THOU/UL (ref 1.4–6.5)
NEUTROPHILS NFR BLD AUTO: 86 % (ref 42–75)
O2 A-A PPRESDIFF RESPIRATORY: 150.88 MMHG (ref 0–20)
PCO2 BLDA: 57.7 MMHG (ref 35–45)
PH BLDA: 7.32 [PH] (ref 7.35–7.45)
PLATELET # BLD AUTO: 231 THOU/UL (ref 130–400)
PO2 BLDA: 62.2 MMHG (ref 80–100)
POTASSIUM BLD-SCNC: 4.37 MMOL/L (ref 3.7–5.3)
POTASSIUM SERPL-SCNC: 4.2 MMOL/L (ref 3.5–5.1)
RBC # BLD AUTO: 4.83 MILL/UL (ref 4.2–5.4)
SODIUM SERPL-SCNC: 142 MMOL/L (ref 136–145)
SPECIMEN DRAWN FROM PATIENT: (no result)
WBC # BLD AUTO: 12.6 THOU/UL (ref 4.8–10.8)

## 2021-07-31 RX ADMIN — ASPIRIN 81 MG CHEWABLE TABLET SCH MG: 81 TABLET CHEWABLE at 08:15

## 2021-07-31 RX ADMIN — Medication SCH MLS: at 05:09

## 2021-07-31 RX ADMIN — NIFEDIPINE SCH MG: 60 TABLET, EXTENDED RELEASE ORAL at 09:52

## 2021-07-31 RX ADMIN — INSULIN LISPRO PRN UNIT: 100 INJECTION, SOLUTION INTRAVENOUS; SUBCUTANEOUS at 18:07

## 2021-07-31 RX ADMIN — INSULIN LISPRO PRN UNIT: 100 INJECTION, SOLUTION INTRAVENOUS; SUBCUTANEOUS at 22:27

## 2021-07-31 RX ADMIN — Medication SCH: at 08:16

## 2021-07-31 RX ADMIN — INSULIN LISPRO PRN UNIT: 100 INJECTION, SOLUTION INTRAVENOUS; SUBCUTANEOUS at 03:35

## 2021-07-31 RX ADMIN — Medication SCH ML: at 20:34

## 2021-08-01 LAB
ANALYZER IN CARDIO: (no result)
ANION GAP SERPL CALC-SCNC: 13 MMOL/L (ref 10–20)
ANION GAP SERPL CALC-SCNC: 9 MMOL/L (ref 10–20)
BASE EXCESS STD BLDA CALC-SCNC: 2.1 MEQ/L
BASOPHILS # BLD AUTO: 0 THOU/UL (ref 0–0.2)
BASOPHILS NFR BLD AUTO: 0.2 % (ref 0–1)
BUN SERPL-MCNC: 36 MG/DL (ref 9.8–20.1)
BUN SERPL-MCNC: 39 MG/DL (ref 9.8–20.1)
CA-I BLDA-SCNC: 1.33 MMOL/L (ref 1.12–1.3)
CALCIUM SERPL-MCNC: 10 MG/DL (ref 7.8–10.44)
CALCIUM SERPL-MCNC: 9.7 MG/DL (ref 7.8–10.44)
CHLORIDE SERPL-SCNC: 105 MMOL/L (ref 98–107)
CHLORIDE SERPL-SCNC: 107 MMOL/L (ref 98–107)
CO2 SERPL-SCNC: 28 MMOL/L (ref 22–29)
CO2 SERPL-SCNC: 30 MMOL/L (ref 22–29)
CREAT CL PREDICTED SERPL C-G-VRATE: 160 ML/MIN (ref 70–130)
CREAT CL PREDICTED SERPL C-G-VRATE: 191 ML/MIN (ref 70–130)
EOSINOPHIL # BLD AUTO: 0 THOU/UL (ref 0–0.7)
EOSINOPHIL NFR BLD AUTO: 0.1 % (ref 0–10)
GLUCOSE SERPL-MCNC: 184 MG/DL (ref 70–105)
GLUCOSE SERPL-MCNC: 190 MG/DL (ref 70–105)
HCO3 BLDA-SCNC: 27.5 MEQ/L (ref 22–28)
HCT VFR BLDA CALC: 45 % (ref 36–47)
HGB BLD-MCNC: 14.5 G/DL (ref 12–16)
HGB BLDA-MCNC: 15.4 G/DL (ref 12–16)
LYMPHOCYTES # BLD: 0.9 THOU/UL (ref 1.2–3.4)
LYMPHOCYTES NFR BLD AUTO: 7.7 % (ref 21–51)
MAGNESIUM SERPL-MCNC: 2.4 MG/DL (ref 1.6–2.6)
MCH RBC QN AUTO: 28.2 PG (ref 27–31)
MCV RBC AUTO: 87.6 FL (ref 78–98)
MONOCYTES # BLD AUTO: 1.1 THOU/UL (ref 0.11–0.59)
MONOCYTES NFR BLD AUTO: 9.3 % (ref 0–10)
NEUTROPHILS # BLD AUTO: 10 THOU/UL (ref 1.4–6.5)
NEUTROPHILS NFR BLD AUTO: 82.7 % (ref 42–75)
O2 A-A PPRESDIFF RESPIRATORY: 154.82 MMHG (ref 0–20)
PCO2 BLDA: 45.5 MMHG (ref 35–45)
PH BLDA: 7.4 [PH] (ref 7.35–7.45)
PLATELET # BLD AUTO: 231 THOU/UL (ref 130–400)
PO2 BLDA: 73.5 MMHG (ref 80–100)
POTASSIUM BLD-SCNC: 4.28 MMOL/L (ref 3.7–5.3)
POTASSIUM SERPL-SCNC: 3.8 MMOL/L (ref 3.5–5.1)
POTASSIUM SERPL-SCNC: 3.9 MMOL/L (ref 3.5–5.1)
RBC # BLD AUTO: 5.15 MILL/UL (ref 4.2–5.4)
SODIUM SERPL-SCNC: 140 MMOL/L (ref 136–145)
SODIUM SERPL-SCNC: 144 MMOL/L (ref 136–145)
SPECIMEN DRAWN FROM PATIENT: (no result)
WBC # BLD AUTO: 12.1 THOU/UL (ref 4.8–10.8)

## 2021-08-01 PROCEDURE — 5A09557 ASSISTANCE WITH RESPIRATORY VENTILATION, GREATER THAN 96 CONSECUTIVE HOURS, CONTINUOUS POSITIVE AIRWAY PRESSURE: ICD-10-PCS | Performed by: INTERNAL MEDICINE

## 2021-08-01 RX ADMIN — NICARDIPINE HYDROCHLORIDE SCH MLS: 25 INJECTION INTRAVENOUS at 15:46

## 2021-08-01 RX ADMIN — NICARDIPINE HYDROCHLORIDE SCH MLS: 25 INJECTION INTRAVENOUS at 20:04

## 2021-08-01 RX ADMIN — ASPIRIN 81 MG CHEWABLE TABLET SCH MG: 81 TABLET CHEWABLE at 07:44

## 2021-08-01 RX ADMIN — NICARDIPINE HYDROCHLORIDE SCH MLS: 25 INJECTION INTRAVENOUS at 21:52

## 2021-08-01 RX ADMIN — Medication SCH: at 07:45

## 2021-08-01 RX ADMIN — NIFEDIPINE SCH MG: 60 TABLET, EXTENDED RELEASE ORAL at 07:44

## 2021-08-01 RX ADMIN — INSULIN LISPRO PRN UNIT: 100 INJECTION, SOLUTION INTRAVENOUS; SUBCUTANEOUS at 04:09

## 2021-08-01 RX ADMIN — Medication SCH ML: at 20:48

## 2021-08-01 RX ADMIN — NICARDIPINE HYDROCHLORIDE SCH MLS: 25 INJECTION INTRAVENOUS at 18:05

## 2021-08-01 RX ADMIN — INSULIN LISPRO PRN UNIT: 100 INJECTION, SOLUTION INTRAVENOUS; SUBCUTANEOUS at 10:16

## 2021-08-02 LAB
ANION GAP SERPL CALC-SCNC: 13 MMOL/L (ref 10–20)
BASOPHILS # BLD AUTO: 0 THOU/UL (ref 0–0.2)
BASOPHILS NFR BLD AUTO: 0.1 % (ref 0–1)
BUN SERPL-MCNC: 39 MG/DL (ref 9.8–20.1)
CALCIUM SERPL-MCNC: 10 MG/DL (ref 7.8–10.44)
CHLORIDE SERPL-SCNC: 108 MMOL/L (ref 98–107)
CO2 SERPL-SCNC: 26 MMOL/L (ref 22–29)
CREAT CL PREDICTED SERPL C-G-VRATE: 165 ML/MIN (ref 70–130)
EOSINOPHIL # BLD AUTO: 0 THOU/UL (ref 0–0.7)
EOSINOPHIL NFR BLD AUTO: 0.1 % (ref 0–10)
GLUCOSE SERPL-MCNC: 196 MG/DL (ref 70–105)
HGB BLD-MCNC: 14.7 G/DL (ref 12–16)
LYMPHOCYTES # BLD: 1.2 THOU/UL (ref 1.2–3.4)
LYMPHOCYTES NFR BLD AUTO: 6.3 % (ref 21–51)
MCH RBC QN AUTO: 27.1 PG (ref 27–31)
MCV RBC AUTO: 87.2 FL (ref 78–98)
MONOCYTES # BLD AUTO: 0.9 THOU/UL (ref 0.11–0.59)
MONOCYTES NFR BLD AUTO: 4.8 % (ref 0–10)
NEUTROPHILS # BLD AUTO: 16.5 THOU/UL (ref 1.4–6.5)
NEUTROPHILS NFR BLD AUTO: 88.8 % (ref 42–75)
PLATELET # BLD AUTO: 289 THOU/UL (ref 130–400)
POTASSIUM SERPL-SCNC: 4.1 MMOL/L (ref 3.5–5.1)
RBC # BLD AUTO: 5.41 MILL/UL (ref 4.2–5.4)
SODIUM SERPL-SCNC: 143 MMOL/L (ref 136–145)
WBC # BLD AUTO: 18.6 THOU/UL (ref 4.8–10.8)

## 2021-08-02 RX ADMIN — Medication SCH: at 08:20

## 2021-08-02 RX ADMIN — POTASSIUM CHLORIDE SCH MLS: 14.9 INJECTION, SOLUTION INTRAVENOUS at 20:03

## 2021-08-02 RX ADMIN — NICARDIPINE HYDROCHLORIDE SCH MLS: 25 INJECTION INTRAVENOUS at 05:04

## 2021-08-02 RX ADMIN — INSULIN LISPRO PRN UNIT: 100 INJECTION, SOLUTION INTRAVENOUS; SUBCUTANEOUS at 11:06

## 2021-08-02 RX ADMIN — INSULIN LISPRO PRN UNIT: 100 INJECTION, SOLUTION INTRAVENOUS; SUBCUTANEOUS at 05:49

## 2021-08-02 RX ADMIN — ASPIRIN 81 MG CHEWABLE TABLET SCH MG: 81 TABLET CHEWABLE at 08:17

## 2021-08-02 RX ADMIN — POTASSIUM CHLORIDE SCH MLS: 14.9 INJECTION, SOLUTION INTRAVENOUS at 18:51

## 2021-08-02 RX ADMIN — NIFEDIPINE SCH MG: 60 TABLET, EXTENDED RELEASE ORAL at 08:18

## 2021-08-02 RX ADMIN — DILTIAZEM HYDROCHLORIDE SCH MLS: 5 INJECTION INTRAVENOUS at 23:15

## 2021-08-02 RX ADMIN — DILTIAZEM HYDROCHLORIDE SCH MLS: 5 INJECTION INTRAVENOUS at 15:28

## 2021-08-02 RX ADMIN — NICARDIPINE HYDROCHLORIDE SCH MLS: 25 INJECTION INTRAVENOUS at 01:36

## 2021-08-02 RX ADMIN — Medication SCH ML: at 19:55

## 2021-08-03 LAB
ANION GAP SERPL CALC-SCNC: 12 MMOL/L (ref 10–20)
BASOPHILS # BLD AUTO: 0 THOU/UL (ref 0–0.2)
BASOPHILS NFR BLD AUTO: 0.1 % (ref 0–1)
BUN SERPL-MCNC: 41 MG/DL (ref 9.8–20.1)
CALCIUM SERPL-MCNC: 9.9 MG/DL (ref 7.8–10.44)
CHLORIDE SERPL-SCNC: 109 MMOL/L (ref 98–107)
CO2 SERPL-SCNC: 28 MMOL/L (ref 22–29)
CREAT CL PREDICTED SERPL C-G-VRATE: 159 ML/MIN (ref 70–130)
EOSINOPHIL # BLD AUTO: 0 THOU/UL (ref 0–0.7)
EOSINOPHIL NFR BLD AUTO: 0.1 % (ref 0–10)
GLUCOSE SERPL-MCNC: 187 MG/DL (ref 70–105)
HGB BLD-MCNC: 14.2 G/DL (ref 12–16)
LYMPHOCYTES # BLD: 1.1 THOU/UL (ref 1.2–3.4)
LYMPHOCYTES NFR BLD AUTO: 6.6 % (ref 21–51)
MCH RBC QN AUTO: 26.9 PG (ref 27–31)
MCV RBC AUTO: 87.8 FL (ref 78–98)
MONOCYTES # BLD AUTO: 1 THOU/UL (ref 0.11–0.59)
MONOCYTES NFR BLD AUTO: 5.8 % (ref 0–10)
NEUTROPHILS # BLD AUTO: 14.6 THOU/UL (ref 1.4–6.5)
NEUTROPHILS NFR BLD AUTO: 87.4 % (ref 42–75)
PLATELET # BLD AUTO: 264 THOU/UL (ref 130–400)
POTASSIUM SERPL-SCNC: 4.2 MMOL/L (ref 3.5–5.1)
RBC # BLD AUTO: 5.29 MILL/UL (ref 4.2–5.4)
SODIUM SERPL-SCNC: 145 MMOL/L (ref 136–145)
WBC # BLD AUTO: 16.7 THOU/UL (ref 4.8–10.8)

## 2021-08-03 RX ADMIN — NIFEDIPINE SCH MG: 60 TABLET, EXTENDED RELEASE ORAL at 08:12

## 2021-08-03 RX ADMIN — ASPIRIN 81 MG CHEWABLE TABLET SCH MG: 81 TABLET CHEWABLE at 08:12

## 2021-08-03 RX ADMIN — Medication SCH: at 22:02

## 2021-08-03 RX ADMIN — Medication SCH: at 08:14

## 2021-08-03 RX ADMIN — INSULIN LISPRO PRN UNIT: 100 INJECTION, SOLUTION INTRAVENOUS; SUBCUTANEOUS at 16:21

## 2021-08-03 RX ADMIN — INSULIN LISPRO PRN UNIT: 100 INJECTION, SOLUTION INTRAVENOUS; SUBCUTANEOUS at 21:59

## 2021-08-04 LAB
ANION GAP SERPL CALC-SCNC: 11 MMOL/L (ref 10–20)
BASOPHILS # BLD AUTO: 0 THOU/UL (ref 0–0.2)
BASOPHILS NFR BLD AUTO: 0 % (ref 0–1)
BUN SERPL-MCNC: 41 MG/DL (ref 9.8–20.1)
CALCIUM SERPL-MCNC: 9.9 MG/DL (ref 7.8–10.44)
CHLORIDE SERPL-SCNC: 111 MMOL/L (ref 98–107)
CO2 SERPL-SCNC: 29 MMOL/L (ref 22–29)
CREAT CL PREDICTED SERPL C-G-VRATE: 163 ML/MIN (ref 70–130)
EOSINOPHIL # BLD AUTO: 0 THOU/UL (ref 0–0.7)
EOSINOPHIL NFR BLD AUTO: 0.1 % (ref 0–10)
GLUCOSE SERPL-MCNC: 208 MG/DL (ref 70–105)
HGB BLD-MCNC: 13.5 G/DL (ref 12–16)
LYMPHOCYTES # BLD: 1.3 THOU/UL (ref 1.2–3.4)
LYMPHOCYTES NFR BLD AUTO: 8.8 % (ref 21–51)
MCH RBC QN AUTO: 27.4 PG (ref 27–31)
MCV RBC AUTO: 87.6 FL (ref 78–98)
MONOCYTES # BLD AUTO: 0.8 THOU/UL (ref 0.11–0.59)
MONOCYTES NFR BLD AUTO: 5.8 % (ref 0–10)
NEUTROPHILS # BLD AUTO: 12.3 THOU/UL (ref 1.4–6.5)
NEUTROPHILS NFR BLD AUTO: 85.3 % (ref 42–75)
PLATELET # BLD AUTO: 240 THOU/UL (ref 130–400)
POTASSIUM SERPL-SCNC: 3.9 MMOL/L (ref 3.5–5.1)
RBC # BLD AUTO: 4.92 MILL/UL (ref 4.2–5.4)
SODIUM SERPL-SCNC: 147 MMOL/L (ref 136–145)
WBC # BLD AUTO: 14.4 THOU/UL (ref 4.8–10.8)

## 2021-08-04 RX ADMIN — ASPIRIN 81 MG CHEWABLE TABLET SCH MG: 81 TABLET CHEWABLE at 08:47

## 2021-08-04 RX ADMIN — INSULIN LISPRO PRN UNIT: 100 INJECTION, SOLUTION INTRAVENOUS; SUBCUTANEOUS at 16:51

## 2021-08-04 RX ADMIN — SENNOSIDES PRN TAB: 8.6 TABLET, FILM COATED ORAL at 16:07

## 2021-08-04 RX ADMIN — INSULIN LISPRO PRN UNIT: 100 INJECTION, SOLUTION INTRAVENOUS; SUBCUTANEOUS at 08:00

## 2021-08-04 RX ADMIN — Medication SCH ML: at 08:48

## 2021-08-04 RX ADMIN — Medication SCH ML: at 20:53

## 2021-08-04 RX ADMIN — INSULIN LISPRO PRN UNIT: 100 INJECTION, SOLUTION INTRAVENOUS; SUBCUTANEOUS at 12:22

## 2021-08-05 LAB
ANION GAP SERPL CALC-SCNC: 9 MMOL/L (ref 10–20)
BASOPHILS # BLD AUTO: 0 THOU/UL (ref 0–0.2)
BASOPHILS NFR BLD AUTO: 0 % (ref 0–1)
BUN SERPL-MCNC: 30 MG/DL (ref 9.8–20.1)
CALCIUM SERPL-MCNC: 9.7 MG/DL (ref 7.8–10.44)
CHLORIDE SERPL-SCNC: 108 MMOL/L (ref 98–107)
CO2 SERPL-SCNC: 29 MMOL/L (ref 22–29)
CREAT CL PREDICTED SERPL C-G-VRATE: 184 ML/MIN (ref 70–130)
EOSINOPHIL # BLD AUTO: 0 THOU/UL (ref 0–0.7)
EOSINOPHIL NFR BLD AUTO: 0.2 % (ref 0–10)
GLUCOSE SERPL-MCNC: 159 MG/DL (ref 70–105)
HGB BLD-MCNC: 13.6 G/DL (ref 12–16)
LYMPHOCYTES # BLD: 1.7 THOU/UL (ref 1.2–3.4)
LYMPHOCYTES NFR BLD AUTO: 10.6 % (ref 21–51)
MCH RBC QN AUTO: 27.1 PG (ref 27–31)
MCV RBC AUTO: 88.9 FL (ref 78–98)
MONOCYTES # BLD AUTO: 1.3 THOU/UL (ref 0.11–0.59)
MONOCYTES NFR BLD AUTO: 8.1 % (ref 0–10)
NEUTROPHILS # BLD AUTO: 12.8 THOU/UL (ref 1.4–6.5)
NEUTROPHILS NFR BLD AUTO: 81.1 % (ref 42–75)
PLATELET # BLD AUTO: 221 THOU/UL (ref 130–400)
POTASSIUM SERPL-SCNC: 3.4 MMOL/L (ref 3.5–5.1)
RBC # BLD AUTO: 5 MILL/UL (ref 4.2–5.4)
SODIUM SERPL-SCNC: 143 MMOL/L (ref 136–145)
WBC # BLD AUTO: 15.8 THOU/UL (ref 4.8–10.8)

## 2021-08-05 RX ADMIN — ASPIRIN 81 MG CHEWABLE TABLET SCH MG: 81 TABLET CHEWABLE at 08:56

## 2021-08-05 RX ADMIN — Medication SCH ML: at 20:55

## 2021-08-05 RX ADMIN — INSULIN LISPRO PRN UNIT: 100 INJECTION, SOLUTION INTRAVENOUS; SUBCUTANEOUS at 11:53

## 2021-08-05 RX ADMIN — INSULIN LISPRO PRN UNIT: 100 INJECTION, SOLUTION INTRAVENOUS; SUBCUTANEOUS at 17:23

## 2021-08-05 RX ADMIN — Medication SCH ML: at 08:56

## 2021-08-06 LAB
ANION GAP SERPL CALC-SCNC: 10 MMOL/L (ref 10–20)
BASOPHILS # BLD AUTO: 0 THOU/UL (ref 0–0.2)
BASOPHILS NFR BLD AUTO: 0.2 % (ref 0–1)
BUN SERPL-MCNC: 24 MG/DL (ref 9.8–20.1)
CALCIUM SERPL-MCNC: 9.7 MG/DL (ref 7.8–10.44)
CHLORIDE SERPL-SCNC: 102 MMOL/L (ref 98–107)
CO2 SERPL-SCNC: 29 MMOL/L (ref 22–29)
CREAT CL PREDICTED SERPL C-G-VRATE: 215 ML/MIN (ref 70–130)
EOSINOPHIL # BLD AUTO: 0 THOU/UL (ref 0–0.7)
EOSINOPHIL NFR BLD AUTO: 0.3 % (ref 0–10)
GLUCOSE SERPL-MCNC: 125 MG/DL (ref 70–105)
HGB BLD-MCNC: 13.7 G/DL (ref 12–16)
LYMPHOCYTES # BLD: 2.3 THOU/UL (ref 1.2–3.4)
LYMPHOCYTES NFR BLD AUTO: 14.8 % (ref 21–51)
MCH RBC QN AUTO: 27.7 PG (ref 27–31)
MCV RBC AUTO: 87.6 FL (ref 78–98)
MONOCYTES # BLD AUTO: 1.3 THOU/UL (ref 0.11–0.59)
MONOCYTES NFR BLD AUTO: 8.4 % (ref 0–10)
NEUTROPHILS # BLD AUTO: 12.1 THOU/UL (ref 1.4–6.5)
NEUTROPHILS NFR BLD AUTO: 76.4 % (ref 42–75)
PLATELET # BLD AUTO: 186 THOU/UL (ref 130–400)
POTASSIUM SERPL-SCNC: 3.3 MMOL/L (ref 3.5–5.1)
RBC # BLD AUTO: 4.94 MILL/UL (ref 4.2–5.4)
SODIUM SERPL-SCNC: 138 MMOL/L (ref 136–145)
WBC # BLD AUTO: 15.8 THOU/UL (ref 4.8–10.8)

## 2021-08-06 RX ADMIN — Medication SCH ML: at 08:30

## 2021-08-06 RX ADMIN — ASPIRIN 81 MG CHEWABLE TABLET SCH MG: 81 TABLET CHEWABLE at 08:29

## 2021-08-06 RX ADMIN — Medication SCH ML: at 20:22

## 2021-08-07 LAB
ANION GAP SERPL CALC-SCNC: 16 MMOL/L (ref 10–20)
BASOPHILS # BLD AUTO: 0.1 THOU/UL (ref 0–0.2)
BASOPHILS NFR BLD AUTO: 0.4 % (ref 0–1)
BUN SERPL-MCNC: 20 MG/DL (ref 9.8–20.1)
CALCIUM SERPL-MCNC: 9.2 MG/DL (ref 7.8–10.44)
CHLORIDE SERPL-SCNC: 102 MMOL/L (ref 98–107)
CO2 SERPL-SCNC: 21 MMOL/L (ref 22–29)
CREAT CL PREDICTED SERPL C-G-VRATE: 231 ML/MIN (ref 70–130)
EOSINOPHIL # BLD AUTO: 0.2 THOU/UL (ref 0–0.7)
EOSINOPHIL NFR BLD AUTO: 1.3 % (ref 0–10)
GLUCOSE SERPL-MCNC: 144 MG/DL (ref 70–105)
HGB BLD-MCNC: 13.2 G/DL (ref 12–16)
LYMPHOCYTES # BLD: 2.6 THOU/UL (ref 1.2–3.4)
LYMPHOCYTES NFR BLD AUTO: 17.1 % (ref 21–51)
MCH RBC QN AUTO: 26.3 PG (ref 27–31)
MCV RBC AUTO: 86.6 FL (ref 78–98)
MONOCYTES # BLD AUTO: 1.1 THOU/UL (ref 0.11–0.59)
MONOCYTES NFR BLD AUTO: 7.4 % (ref 0–10)
NEUTROPHILS # BLD AUTO: 11.2 THOU/UL (ref 1.4–6.5)
NEUTROPHILS NFR BLD AUTO: 73.8 % (ref 42–75)
PLATELET # BLD AUTO: 178 THOU/UL (ref 130–400)
POTASSIUM SERPL-SCNC: 3.8 MMOL/L (ref 3.5–5.1)
RBC # BLD AUTO: 5.02 MILL/UL (ref 4.2–5.4)
SODIUM SERPL-SCNC: 135 MMOL/L (ref 136–145)
WBC # BLD AUTO: 15.2 THOU/UL (ref 4.8–10.8)

## 2021-08-07 RX ADMIN — ASPIRIN 81 MG CHEWABLE TABLET SCH MG: 81 TABLET CHEWABLE at 08:45

## 2021-08-07 RX ADMIN — Medication SCH ML: at 09:06

## 2021-08-07 RX ADMIN — Medication SCH ML: at 20:17

## 2021-08-08 LAB
ANION GAP SERPL CALC-SCNC: 11 MMOL/L (ref 10–20)
BASOPHILS # BLD AUTO: 0 THOU/UL (ref 0–0.2)
BASOPHILS NFR BLD AUTO: 0 % (ref 0–1)
BUN SERPL-MCNC: 20 MG/DL (ref 9.8–20.1)
CALCIUM SERPL-MCNC: 9.4 MG/DL (ref 7.8–10.44)
CHLORIDE SERPL-SCNC: 101 MMOL/L (ref 98–107)
CO2 SERPL-SCNC: 28 MMOL/L (ref 22–29)
CREAT CL PREDICTED SERPL C-G-VRATE: 215 ML/MIN (ref 70–130)
EOSINOPHIL # BLD AUTO: 0.1 THOU/UL (ref 0–0.7)
EOSINOPHIL NFR BLD AUTO: 0.7 % (ref 0–10)
GLUCOSE SERPL-MCNC: 107 MG/DL (ref 70–105)
HGB BLD-MCNC: 13.5 G/DL (ref 12–16)
LYMPHOCYTES # BLD: 2.6 THOU/UL (ref 1.2–3.4)
LYMPHOCYTES NFR BLD AUTO: 18.4 % (ref 21–51)
MCH RBC QN AUTO: 27.3 PG (ref 27–31)
MCV RBC AUTO: 87.5 FL (ref 78–98)
MONOCYTES # BLD AUTO: 1.1 THOU/UL (ref 0.11–0.59)
MONOCYTES NFR BLD AUTO: 8.2 % (ref 0–10)
NEUTROPHILS # BLD AUTO: 10.1 THOU/UL (ref 1.4–6.5)
NEUTROPHILS NFR BLD AUTO: 72.7 % (ref 42–75)
PLATELET # BLD AUTO: 198 THOU/UL (ref 130–400)
POTASSIUM SERPL-SCNC: 3.6 MMOL/L (ref 3.5–5.1)
RBC # BLD AUTO: 4.94 MILL/UL (ref 4.2–5.4)
SODIUM SERPL-SCNC: 136 MMOL/L (ref 136–145)
WBC # BLD AUTO: 14 THOU/UL (ref 4.8–10.8)

## 2021-08-08 RX ADMIN — Medication SCH ML: at 20:46

## 2021-08-08 RX ADMIN — Medication SCH ML: at 09:34

## 2021-08-08 RX ADMIN — ASPIRIN 81 MG CHEWABLE TABLET SCH MG: 81 TABLET CHEWABLE at 09:33

## 2021-08-09 LAB
ANION GAP SERPL CALC-SCNC: 15 MMOL/L (ref 10–20)
BASOPHILS # BLD AUTO: 0 THOU/UL (ref 0–0.2)
BASOPHILS NFR BLD AUTO: 0.2 % (ref 0–1)
BUN SERPL-MCNC: 22 MG/DL (ref 9.8–20.1)
CALCIUM SERPL-MCNC: 9.6 MG/DL (ref 7.8–10.44)
CHLORIDE SERPL-SCNC: 101 MMOL/L (ref 98–107)
CO2 SERPL-SCNC: 24 MMOL/L (ref 22–29)
CREAT CL PREDICTED SERPL C-G-VRATE: 182 ML/MIN (ref 70–130)
EOSINOPHIL # BLD AUTO: 0.2 THOU/UL (ref 0–0.7)
EOSINOPHIL NFR BLD AUTO: 1.1 % (ref 0–10)
GLUCOSE SERPL-MCNC: 142 MG/DL (ref 70–105)
HGB BLD-MCNC: 13.7 G/DL (ref 12–16)
LYMPHOCYTES # BLD: 2.6 THOU/UL (ref 1.2–3.4)
LYMPHOCYTES NFR BLD AUTO: 17.1 % (ref 21–51)
MCH RBC QN AUTO: 26.7 PG (ref 27–31)
MCV RBC AUTO: 86 FL (ref 78–98)
MONOCYTES # BLD AUTO: 1.2 THOU/UL (ref 0.11–0.59)
MONOCYTES NFR BLD AUTO: 7.7 % (ref 0–10)
NEUTROPHILS # BLD AUTO: 11.3 THOU/UL (ref 1.4–6.5)
NEUTROPHILS NFR BLD AUTO: 73.9 % (ref 42–75)
PLATELET # BLD AUTO: 215 THOU/UL (ref 130–400)
POTASSIUM SERPL-SCNC: 3.8 MMOL/L (ref 3.5–5.1)
RBC # BLD AUTO: 5.14 MILL/UL (ref 4.2–5.4)
SODIUM SERPL-SCNC: 136 MMOL/L (ref 136–145)
WBC # BLD AUTO: 15.2 THOU/UL (ref 4.8–10.8)

## 2021-08-09 RX ADMIN — ASPIRIN 81 MG CHEWABLE TABLET SCH MG: 81 TABLET CHEWABLE at 08:41

## 2021-08-09 RX ADMIN — Medication SCH ML: at 20:24

## 2021-08-09 RX ADMIN — Medication SCH: at 08:43

## 2021-08-09 RX ADMIN — INSULIN LISPRO PRN UNIT: 100 INJECTION, SOLUTION INTRAVENOUS; SUBCUTANEOUS at 17:56

## 2021-08-10 LAB
ANION GAP SERPL CALC-SCNC: 15 MMOL/L (ref 10–20)
BASOPHILS # BLD AUTO: 0 THOU/UL (ref 0–0.2)
BASOPHILS NFR BLD AUTO: 0 % (ref 0–1)
BUN SERPL-MCNC: 21 MG/DL (ref 9.8–20.1)
CALCIUM SERPL-MCNC: 9.6 MG/DL (ref 7.8–10.44)
CHLORIDE SERPL-SCNC: 102 MMOL/L (ref 98–107)
CO2 SERPL-SCNC: 22 MMOL/L (ref 22–29)
CREAT CL PREDICTED SERPL C-G-VRATE: 197 ML/MIN (ref 70–130)
EOSINOPHIL # BLD AUTO: 0.1 THOU/UL (ref 0–0.7)
EOSINOPHIL NFR BLD AUTO: 0.9 % (ref 0–10)
GLUCOSE SERPL-MCNC: 105 MG/DL (ref 70–105)
HGB BLD-MCNC: 13.8 G/DL (ref 12–16)
LYMPHOCYTES # BLD: 2.3 THOU/UL (ref 1.2–3.4)
LYMPHOCYTES NFR BLD AUTO: 16.5 % (ref 21–51)
MCH RBC QN AUTO: 27.1 PG (ref 27–31)
MCV RBC AUTO: 85.2 FL (ref 78–98)
MONOCYTES # BLD AUTO: 1.1 THOU/UL (ref 0.11–0.59)
MONOCYTES NFR BLD AUTO: 8.2 % (ref 0–10)
NEUTROPHILS # BLD AUTO: 10.3 THOU/UL (ref 1.4–6.5)
NEUTROPHILS NFR BLD AUTO: 74.4 % (ref 42–75)
PLATELET # BLD AUTO: 232 THOU/UL (ref 130–400)
POTASSIUM SERPL-SCNC: 4.4 MMOL/L (ref 3.5–5.1)
RBC # BLD AUTO: 5.08 MILL/UL (ref 4.2–5.4)
SODIUM SERPL-SCNC: 135 MMOL/L (ref 136–145)
WBC # BLD AUTO: 13.9 THOU/UL (ref 4.8–10.8)

## 2021-08-10 RX ADMIN — Medication SCH ML: at 08:49

## 2021-08-10 RX ADMIN — CALCIUM CARBONATE PRN MG: 500 TABLET, CHEWABLE ORAL at 22:41

## 2021-08-10 RX ADMIN — ASPIRIN 81 MG CHEWABLE TABLET SCH MG: 81 TABLET CHEWABLE at 08:40

## 2021-08-10 RX ADMIN — Medication SCH ML: at 21:00

## 2021-08-10 RX ADMIN — INSULIN LISPRO PRN UNIT: 100 INJECTION, SOLUTION INTRAVENOUS; SUBCUTANEOUS at 16:26

## 2021-08-10 RX ADMIN — CALCIUM CARBONATE PRN MG: 500 TABLET, CHEWABLE ORAL at 08:35

## 2021-08-11 RX ADMIN — ASPIRIN 81 MG CHEWABLE TABLET SCH MG: 81 TABLET CHEWABLE at 07:58

## 2021-08-11 RX ADMIN — INSULIN LISPRO PRN UNIT: 100 INJECTION, SOLUTION INTRAVENOUS; SUBCUTANEOUS at 16:16

## 2021-08-11 RX ADMIN — Medication SCH ML: at 20:19

## 2021-08-11 RX ADMIN — Medication SCH ML: at 07:59

## 2021-08-12 RX ADMIN — ASPIRIN 81 MG CHEWABLE TABLET SCH MG: 81 TABLET CHEWABLE at 10:27

## 2021-08-12 RX ADMIN — Medication SCH ML: at 20:37

## 2021-08-12 RX ADMIN — Medication SCH ML: at 10:50

## 2021-08-13 RX ADMIN — SENNOSIDES PRN TAB: 8.6 TABLET, FILM COATED ORAL at 06:04

## 2021-08-13 RX ADMIN — ASPIRIN 81 MG CHEWABLE TABLET SCH MG: 81 TABLET CHEWABLE at 09:50

## 2021-08-13 RX ADMIN — Medication SCH ML: at 09:58

## 2021-08-13 RX ADMIN — Medication SCH ML: at 21:07

## 2021-08-14 RX ADMIN — ASPIRIN 81 MG CHEWABLE TABLET SCH MG: 81 TABLET CHEWABLE at 08:12

## 2021-08-14 RX ADMIN — Medication SCH ML: at 21:56

## 2021-08-14 RX ADMIN — SENNOSIDES PRN TAB: 8.6 TABLET, FILM COATED ORAL at 14:40

## 2021-08-14 RX ADMIN — Medication SCH ML: at 08:22

## 2021-08-15 RX ADMIN — SENNOSIDES PRN TAB: 8.6 TABLET, FILM COATED ORAL at 12:06

## 2021-08-15 RX ADMIN — Medication SCH ML: at 08:59

## 2021-08-15 RX ADMIN — ASPIRIN 81 MG CHEWABLE TABLET SCH MG: 81 TABLET CHEWABLE at 08:28

## 2021-08-15 RX ADMIN — Medication SCH ML: at 20:33

## 2021-08-16 RX ADMIN — ASPIRIN 81 MG CHEWABLE TABLET SCH MG: 81 TABLET CHEWABLE at 08:50

## 2021-08-16 RX ADMIN — Medication SCH ML: at 20:48

## 2021-08-16 RX ADMIN — Medication SCH ML: at 11:01

## 2021-08-16 RX ADMIN — CALCIUM CARBONATE PRN MG: 500 TABLET, CHEWABLE ORAL at 17:34

## 2021-08-17 RX ADMIN — ASPIRIN 81 MG CHEWABLE TABLET SCH MG: 81 TABLET CHEWABLE at 08:39

## 2021-08-17 RX ADMIN — Medication SCH ML: at 11:09

## 2021-08-17 RX ADMIN — CALCIUM CARBONATE PRN MG: 500 TABLET, CHEWABLE ORAL at 20:12

## 2021-08-18 RX ADMIN — CALCIUM CARBONATE PRN MG: 500 TABLET, CHEWABLE ORAL at 23:49

## 2021-08-18 RX ADMIN — Medication SCH ML: at 21:33

## 2021-08-18 RX ADMIN — Medication SCH ML: at 08:36

## 2021-08-18 RX ADMIN — Medication SCH: at 07:01

## 2021-08-18 RX ADMIN — ASPIRIN 81 MG CHEWABLE TABLET SCH MG: 81 TABLET CHEWABLE at 08:31

## 2021-08-19 RX ADMIN — CALCIUM CARBONATE PRN MG: 500 TABLET, CHEWABLE ORAL at 09:27

## 2021-08-19 RX ADMIN — ASPIRIN 81 MG CHEWABLE TABLET SCH MG: 81 TABLET CHEWABLE at 09:28

## 2021-08-19 RX ADMIN — Medication SCH: at 11:30

## 2021-08-19 RX ADMIN — Medication SCH ML: at 22:16

## 2021-08-20 RX ADMIN — ASPIRIN 81 MG CHEWABLE TABLET SCH MG: 81 TABLET CHEWABLE at 10:30

## 2021-08-20 RX ADMIN — Medication SCH: at 12:42

## 2021-08-20 RX ADMIN — Medication SCH ML: at 21:33

## 2021-08-21 RX ADMIN — ASPIRIN 81 MG CHEWABLE TABLET SCH MG: 81 TABLET CHEWABLE at 09:25

## 2021-08-21 RX ADMIN — CALCIUM CARBONATE PRN MG: 500 TABLET, CHEWABLE ORAL at 22:00

## 2021-08-21 RX ADMIN — Medication SCH ML: at 21:56

## 2021-08-21 RX ADMIN — Medication SCH ML: at 09:27

## 2021-08-22 RX ADMIN — ASPIRIN 81 MG CHEWABLE TABLET SCH MG: 81 TABLET CHEWABLE at 09:05

## 2021-08-22 RX ADMIN — Medication SCH ML: at 22:03

## 2021-08-22 RX ADMIN — Medication SCH ML: at 09:09

## 2021-08-23 LAB
ANION GAP SERPL CALC-SCNC: 13 MMOL/L (ref 10–20)
ANION GAP SERPL CALC-SCNC: 14 MMOL/L (ref 10–20)
BASOPHILS # BLD AUTO: 0.1 THOU/UL (ref 0–0.2)
BASOPHILS NFR BLD AUTO: 0.6 % (ref 0–1)
BUN SERPL-MCNC: 12 MG/DL (ref 9.8–20.1)
BUN SERPL-MCNC: 12 MG/DL (ref 9.8–20.1)
CALCIUM SERPL-MCNC: 9 MG/DL (ref 7.8–10.44)
CALCIUM SERPL-MCNC: 9.5 MG/DL (ref 7.8–10.44)
CHLORIDE SERPL-SCNC: 102 MMOL/L (ref 98–107)
CHLORIDE SERPL-SCNC: 103 MMOL/L (ref 98–107)
CO2 SERPL-SCNC: 25 MMOL/L (ref 22–29)
CO2 SERPL-SCNC: 26 MMOL/L (ref 22–29)
CREAT CL PREDICTED SERPL C-G-VRATE: 153 ML/MIN (ref 70–130)
CREAT CL PREDICTED SERPL C-G-VRATE: 185 ML/MIN (ref 70–130)
EOSINOPHIL # BLD AUTO: 0.1 THOU/UL (ref 0–0.7)
EOSINOPHIL NFR BLD AUTO: 1.1 % (ref 0–10)
GLUCOSE SERPL-MCNC: 142 MG/DL (ref 70–105)
GLUCOSE SERPL-MCNC: 94 MG/DL (ref 70–105)
HGB BLD-MCNC: 11.4 G/DL (ref 12–16)
LYMPHOCYTES # BLD: 2.3 THOU/UL (ref 1.2–3.4)
LYMPHOCYTES NFR BLD AUTO: 22.9 % (ref 21–51)
MAGNESIUM SERPL-MCNC: 1.5 MG/DL (ref 1.6–2.6)
MCH RBC QN AUTO: 26.9 PG (ref 27–31)
MCV RBC AUTO: 86 FL (ref 78–98)
MONOCYTES # BLD AUTO: 0.7 THOU/UL (ref 0.11–0.59)
MONOCYTES NFR BLD AUTO: 7.3 % (ref 0–10)
NEUTROPHILS # BLD AUTO: 6.8 THOU/UL (ref 1.4–6.5)
NEUTROPHILS NFR BLD AUTO: 68.1 % (ref 42–75)
PLATELET # BLD AUTO: 303 THOU/UL (ref 130–400)
POTASSIUM SERPL-SCNC: 3 MMOL/L (ref 3.5–5.1)
POTASSIUM SERPL-SCNC: 3.3 MMOL/L (ref 3.5–5.1)
RBC # BLD AUTO: 4.26 MILL/UL (ref 4.2–5.4)
SODIUM SERPL-SCNC: 138 MMOL/L (ref 136–145)
SODIUM SERPL-SCNC: 139 MMOL/L (ref 136–145)
WBC # BLD AUTO: 10 THOU/UL (ref 4.8–10.8)

## 2021-08-23 RX ADMIN — ASPIRIN 81 MG CHEWABLE TABLET SCH MG: 81 TABLET CHEWABLE at 08:26

## 2021-08-23 RX ADMIN — Medication SCH ML: at 08:27

## 2021-08-23 RX ADMIN — Medication SCH ML: at 22:12

## 2021-08-24 LAB
ANION GAP SERPL CALC-SCNC: 11 MMOL/L (ref 10–20)
BASOPHILS # BLD AUTO: 0 THOU/UL (ref 0–0.2)
BASOPHILS NFR BLD AUTO: 0.4 % (ref 0–1)
BUN SERPL-MCNC: 14 MG/DL (ref 9.8–20.1)
CALCIUM SERPL-MCNC: 9.2 MG/DL (ref 7.8–10.44)
CHLORIDE SERPL-SCNC: 106 MMOL/L (ref 98–107)
CO2 SERPL-SCNC: 25 MMOL/L (ref 22–29)
CREAT CL PREDICTED SERPL C-G-VRATE: 201 ML/MIN (ref 70–130)
EOSINOPHIL # BLD AUTO: 0.1 THOU/UL (ref 0–0.7)
EOSINOPHIL NFR BLD AUTO: 1.1 % (ref 0–10)
GLUCOSE SERPL-MCNC: 106 MG/DL (ref 70–105)
HGB BLD-MCNC: 11.6 G/DL (ref 12–16)
LYMPHOCYTES # BLD: 2.5 THOU/UL (ref 1.2–3.4)
LYMPHOCYTES NFR BLD AUTO: 23.5 % (ref 21–51)
MCH RBC QN AUTO: 28.2 PG (ref 27–31)
MCV RBC AUTO: 85.9 FL (ref 78–98)
MONOCYTES # BLD AUTO: 0.9 THOU/UL (ref 0.11–0.59)
MONOCYTES NFR BLD AUTO: 8.6 % (ref 0–10)
NEUTROPHILS # BLD AUTO: 7 THOU/UL (ref 1.4–6.5)
NEUTROPHILS NFR BLD AUTO: 66.4 % (ref 42–75)
PLATELET # BLD AUTO: 310 THOU/UL (ref 130–400)
POTASSIUM SERPL-SCNC: 3.8 MMOL/L (ref 3.5–5.1)
RBC # BLD AUTO: 4.13 MILL/UL (ref 4.2–5.4)
SODIUM SERPL-SCNC: 138 MMOL/L (ref 136–145)
WBC # BLD AUTO: 10.5 THOU/UL (ref 4.8–10.8)

## 2021-08-24 RX ADMIN — Medication SCH ML: at 22:25

## 2021-08-24 RX ADMIN — Medication SCH ML: at 09:00

## 2021-08-24 RX ADMIN — ASPIRIN 81 MG CHEWABLE TABLET SCH MG: 81 TABLET CHEWABLE at 09:00

## 2021-08-25 LAB — MAGNESIUM SERPL-MCNC: 1.8 MG/DL (ref 1.6–2.6)

## 2021-08-25 RX ADMIN — ASPIRIN 81 MG CHEWABLE TABLET SCH MG: 81 TABLET CHEWABLE at 08:58

## 2021-08-25 RX ADMIN — Medication SCH ML: at 20:11

## 2021-08-25 RX ADMIN — Medication SCH ML: at 09:01

## 2021-08-26 LAB
ANION GAP SERPL CALC-SCNC: 12 MMOL/L (ref 10–20)
BUN SERPL-MCNC: 12 MG/DL (ref 9.8–20.1)
CALCIUM SERPL-MCNC: 9.4 MG/DL (ref 7.8–10.44)
CHLORIDE SERPL-SCNC: 103 MMOL/L (ref 98–107)
CO2 SERPL-SCNC: 25 MMOL/L (ref 22–29)
CREAT CL PREDICTED SERPL C-G-VRATE: 191 ML/MIN (ref 70–130)
GLUCOSE SERPL-MCNC: 113 MG/DL (ref 70–105)
POTASSIUM SERPL-SCNC: 3.6 MMOL/L (ref 3.5–5.1)
SODIUM SERPL-SCNC: 136 MMOL/L (ref 136–145)

## 2021-08-26 RX ADMIN — Medication SCH ML: at 20:57

## 2021-08-26 RX ADMIN — ASPIRIN 81 MG CHEWABLE TABLET SCH MG: 81 TABLET CHEWABLE at 08:54

## 2021-08-26 RX ADMIN — Medication SCH ML: at 09:05

## 2021-08-27 RX ADMIN — Medication SCH ML: at 09:02

## 2021-08-27 RX ADMIN — ASPIRIN 81 MG CHEWABLE TABLET SCH MG: 81 TABLET CHEWABLE at 09:00

## 2021-08-27 RX ADMIN — Medication SCH ML: at 21:17

## 2021-08-28 RX ADMIN — Medication SCH ML: at 20:40

## 2021-08-28 RX ADMIN — Medication SCH ML: at 09:13

## 2021-08-28 RX ADMIN — ASPIRIN 81 MG CHEWABLE TABLET SCH MG: 81 TABLET CHEWABLE at 09:12

## 2021-08-29 LAB
ANION GAP SERPL CALC-SCNC: 13 MMOL/L (ref 10–20)
BACTERIA UR QL AUTO: (no result) HPF
BASOPHILS # BLD AUTO: 0 THOU/UL (ref 0–0.2)
BASOPHILS NFR BLD AUTO: 0.2 % (ref 0–1)
BUN SERPL-MCNC: 15 MG/DL (ref 9.8–20.1)
CALCIUM SERPL-MCNC: 9.5 MG/DL (ref 7.8–10.44)
CHLORIDE SERPL-SCNC: 104 MMOL/L (ref 98–107)
CO2 SERPL-SCNC: 24 MMOL/L (ref 22–29)
CREAT CL PREDICTED SERPL C-G-VRATE: 0 ML/MIN (ref 70–130)
EOSINOPHIL # BLD AUTO: 0.1 THOU/UL (ref 0–0.7)
EOSINOPHIL NFR BLD AUTO: 0.4 % (ref 0–10)
GLUCOSE SERPL-MCNC: 141 MG/DL (ref 70–105)
HGB BLD-MCNC: 10.3 G/DL (ref 12–16)
LEUKOCYTE ESTERASE UR QL STRIP.AUTO: 500 LEU/UL
LYMPHOCYTES # BLD: 1.9 THOU/UL (ref 1.2–3.4)
LYMPHOCYTES NFR BLD AUTO: 13.6 % (ref 21–51)
MCH RBC QN AUTO: 28.4 PG (ref 27–31)
MCV RBC AUTO: 86.7 FL (ref 78–98)
MONOCYTES # BLD AUTO: 0.9 THOU/UL (ref 0.11–0.59)
MONOCYTES NFR BLD AUTO: 6.1 % (ref 0–10)
NEUTROPHILS # BLD AUTO: 11.3 THOU/UL (ref 1.4–6.5)
NEUTROPHILS NFR BLD AUTO: 79.7 % (ref 42–75)
PLATELET # BLD AUTO: 278 THOU/UL (ref 130–400)
POTASSIUM SERPL-SCNC: 3.3 MMOL/L (ref 3.5–5.1)
RBC # BLD AUTO: 3.62 MILL/UL (ref 4.2–5.4)
RBC UR QL AUTO: (no result) HPF (ref 0–3)
SODIUM SERPL-SCNC: 138 MMOL/L (ref 136–145)
SP GR UR STRIP: 1.01 (ref 1–1.04)
WBC # BLD AUTO: 14.2 THOU/UL (ref 4.8–10.8)
WBC UR QL AUTO: (no result) HPF (ref 0–3)

## 2021-08-29 RX ADMIN — ASPIRIN 81 MG CHEWABLE TABLET SCH MG: 81 TABLET CHEWABLE at 10:04

## 2021-08-29 RX ADMIN — Medication SCH ML: at 21:18

## 2021-08-29 RX ADMIN — Medication SCH ML: at 10:05

## 2021-08-30 LAB
ANION GAP SERPL CALC-SCNC: 10 MMOL/L (ref 10–20)
BASOPHILS # BLD AUTO: 0 THOU/UL (ref 0–0.2)
BASOPHILS NFR BLD AUTO: 0.1 % (ref 0–1)
BUN SERPL-MCNC: 14 MG/DL (ref 9.8–20.1)
CALCIUM SERPL-MCNC: 9.7 MG/DL (ref 7.8–10.44)
CHLORIDE SERPL-SCNC: 104 MMOL/L (ref 98–107)
CO2 SERPL-SCNC: 26 MMOL/L (ref 22–29)
CREAT CL PREDICTED SERPL C-G-VRATE: 0 ML/MIN (ref 70–130)
EOSINOPHIL # BLD AUTO: 0.1 THOU/UL (ref 0–0.7)
EOSINOPHIL NFR BLD AUTO: 0.5 % (ref 0–10)
GLUCOSE SERPL-MCNC: 117 MG/DL (ref 70–105)
HGB BLD-MCNC: 11 G/DL (ref 12–16)
LYMPHOCYTES # BLD: 1.7 THOU/UL (ref 1.2–3.4)
LYMPHOCYTES NFR BLD AUTO: 11.4 % (ref 21–51)
MCH RBC QN AUTO: 27.3 PG (ref 27–31)
MCV RBC AUTO: 86.2 FL (ref 78–98)
MONOCYTES # BLD AUTO: 1.3 THOU/UL (ref 0.11–0.59)
MONOCYTES NFR BLD AUTO: 8.7 % (ref 0–10)
NEUTROPHILS # BLD AUTO: 11.5 THOU/UL (ref 1.4–6.5)
NEUTROPHILS NFR BLD AUTO: 79.4 % (ref 42–75)
PLATELET # BLD AUTO: 273 THOU/UL (ref 130–400)
POTASSIUM SERPL-SCNC: 3.9 MMOL/L (ref 3.5–5.1)
RBC # BLD AUTO: 4.04 MILL/UL (ref 4.2–5.4)
SODIUM SERPL-SCNC: 136 MMOL/L (ref 136–145)
WBC # BLD AUTO: 14.4 THOU/UL (ref 4.8–10.8)

## 2021-08-30 RX ADMIN — ASPIRIN 81 MG CHEWABLE TABLET SCH MG: 81 TABLET CHEWABLE at 09:08

## 2021-08-30 RX ADMIN — Medication SCH ML: at 20:38

## 2021-08-30 RX ADMIN — Medication SCH ML: at 09:09

## 2021-08-31 LAB
BASOPHILS # BLD AUTO: 0 THOU/UL (ref 0–0.2)
BASOPHILS NFR BLD AUTO: 0.1 % (ref 0–1)
EOSINOPHIL # BLD AUTO: 0 THOU/UL (ref 0–0.7)
EOSINOPHIL NFR BLD AUTO: 0.3 % (ref 0–10)
HGB BLD-MCNC: 11.7 G/DL (ref 12–16)
LYMPHOCYTES # BLD: 2.1 THOU/UL (ref 1.2–3.4)
LYMPHOCYTES NFR BLD AUTO: 13.7 % (ref 21–51)
MCH RBC QN AUTO: 28.1 PG (ref 27–31)
MCV RBC AUTO: 87.1 FL (ref 78–98)
MONOCYTES # BLD AUTO: 1.2 THOU/UL (ref 0.11–0.59)
MONOCYTES NFR BLD AUTO: 7.9 % (ref 0–10)
NEUTROPHILS # BLD AUTO: 12.1 THOU/UL (ref 1.4–6.5)
NEUTROPHILS NFR BLD AUTO: 77.9 % (ref 42–75)
PLATELET # BLD AUTO: 234 THOU/UL (ref 130–400)
RBC # BLD AUTO: 4.15 MILL/UL (ref 4.2–5.4)
WBC # BLD AUTO: 15.5 THOU/UL (ref 4.8–10.8)

## 2021-08-31 RX ADMIN — Medication SCH ML: at 20:58

## 2021-08-31 RX ADMIN — CALCIUM CARBONATE PRN MG: 500 TABLET, CHEWABLE ORAL at 20:54

## 2021-08-31 RX ADMIN — CEFTRIAXONE SCH MLS: 1 INJECTION, POWDER, FOR SOLUTION INTRAMUSCULAR; INTRAVENOUS at 10:54

## 2021-08-31 RX ADMIN — ASPIRIN 81 MG CHEWABLE TABLET SCH MG: 81 TABLET CHEWABLE at 09:44

## 2021-08-31 RX ADMIN — Medication SCH ML: at 11:05

## 2021-09-01 LAB
BASOPHILS # BLD AUTO: 0 THOU/UL (ref 0–0.2)
BASOPHILS NFR BLD AUTO: 0.1 % (ref 0–1)
EOSINOPHIL # BLD AUTO: 0 THOU/UL (ref 0–0.7)
EOSINOPHIL NFR BLD AUTO: 0.4 % (ref 0–10)
HGB BLD-MCNC: 10.6 G/DL (ref 12–16)
LYMPHOCYTES # BLD: 1.9 THOU/UL (ref 1.2–3.4)
LYMPHOCYTES NFR BLD AUTO: 14.5 % (ref 21–51)
MCH RBC QN AUTO: 27.9 PG (ref 27–31)
MCV RBC AUTO: 86.5 FL (ref 78–98)
MONOCYTES # BLD AUTO: 1.1 THOU/UL (ref 0.11–0.59)
MONOCYTES NFR BLD AUTO: 8.7 % (ref 0–10)
NEUTROPHILS # BLD AUTO: 9.9 THOU/UL (ref 1.4–6.5)
NEUTROPHILS NFR BLD AUTO: 76.4 % (ref 42–75)
PLATELET # BLD AUTO: 251 THOU/UL (ref 130–400)
RBC # BLD AUTO: 3.81 MILL/UL (ref 4.2–5.4)
WBC # BLD AUTO: 12.9 THOU/UL (ref 4.8–10.8)

## 2021-09-01 RX ADMIN — Medication SCH ML: at 20:46

## 2021-09-01 RX ADMIN — CEFTRIAXONE SCH MLS: 1 INJECTION, POWDER, FOR SOLUTION INTRAMUSCULAR; INTRAVENOUS at 08:57

## 2021-09-01 RX ADMIN — Medication SCH ML: at 08:54

## 2021-09-01 RX ADMIN — CALCIUM CARBONATE PRN MG: 500 TABLET, CHEWABLE ORAL at 20:53

## 2021-09-01 RX ADMIN — ASPIRIN 81 MG CHEWABLE TABLET SCH MG: 81 TABLET CHEWABLE at 08:53

## 2021-09-02 LAB
BASOPHILS # BLD AUTO: 0 THOU/UL (ref 0–0.2)
BASOPHILS NFR BLD AUTO: 0.4 % (ref 0–1)
EOSINOPHIL # BLD AUTO: 0.1 THOU/UL (ref 0–0.7)
EOSINOPHIL NFR BLD AUTO: 0.6 % (ref 0–10)
HGB BLD-MCNC: 10.2 G/DL (ref 12–16)
LYMPHOCYTES # BLD: 2 THOU/UL (ref 1.2–3.4)
LYMPHOCYTES NFR BLD AUTO: 15.7 % (ref 21–51)
MCH RBC QN AUTO: 27.5 PG (ref 27–31)
MCV RBC AUTO: 86.4 FL (ref 78–98)
MONOCYTES # BLD AUTO: 1.1 THOU/UL (ref 0.11–0.59)
MONOCYTES NFR BLD AUTO: 9.1 % (ref 0–10)
NEUTROPHILS # BLD AUTO: 9.3 THOU/UL (ref 1.4–6.5)
NEUTROPHILS NFR BLD AUTO: 74.3 % (ref 42–75)
PLATELET # BLD AUTO: 261 THOU/UL (ref 130–400)
RBC # BLD AUTO: 3.73 MILL/UL (ref 4.2–5.4)
WBC # BLD AUTO: 12.5 THOU/UL (ref 4.8–10.8)

## 2021-09-02 RX ADMIN — Medication SCH ML: at 08:27

## 2021-09-02 RX ADMIN — ASPIRIN 81 MG CHEWABLE TABLET SCH MG: 81 TABLET CHEWABLE at 08:25

## 2021-09-02 RX ADMIN — CEFTRIAXONE SCH MLS: 1 INJECTION, POWDER, FOR SOLUTION INTRAMUSCULAR; INTRAVENOUS at 08:26

## 2021-09-02 RX ADMIN — Medication SCH ML: at 20:58

## 2021-09-03 LAB
BASOPHILS # BLD AUTO: 0 THOU/UL (ref 0–0.2)
BASOPHILS NFR BLD AUTO: 0 % (ref 0–1)
EOSINOPHIL # BLD AUTO: 0 THOU/UL (ref 0–0.7)
EOSINOPHIL NFR BLD AUTO: 0.4 % (ref 0–10)
HGB BLD-MCNC: 10.5 G/DL (ref 12–16)
LYMPHOCYTES # BLD: 2.3 THOU/UL (ref 1.2–3.4)
LYMPHOCYTES NFR BLD AUTO: 21.2 % (ref 21–51)
MCH RBC QN AUTO: 29 PG (ref 27–31)
MCV RBC AUTO: 86.1 FL (ref 78–98)
MONOCYTES # BLD AUTO: 0.9 THOU/UL (ref 0.11–0.59)
MONOCYTES NFR BLD AUTO: 8.1 % (ref 0–10)
NEUTROPHILS # BLD AUTO: 7.6 THOU/UL (ref 1.4–6.5)
NEUTROPHILS NFR BLD AUTO: 70.3 % (ref 42–75)
PLATELET # BLD AUTO: 287 THOU/UL (ref 130–400)
RBC # BLD AUTO: 3.62 MILL/UL (ref 4.2–5.4)
WBC # BLD AUTO: 10.8 THOU/UL (ref 4.8–10.8)

## 2021-09-03 RX ADMIN — ASPIRIN 81 MG CHEWABLE TABLET SCH MG: 81 TABLET CHEWABLE at 09:12

## 2021-09-03 RX ADMIN — Medication SCH ML: at 23:40

## 2021-09-03 RX ADMIN — CEFTRIAXONE SCH MLS: 1 INJECTION, POWDER, FOR SOLUTION INTRAMUSCULAR; INTRAVENOUS at 09:13

## 2021-09-03 RX ADMIN — Medication SCH ML: at 09:15

## 2021-09-04 RX ADMIN — CALCIUM CARBONATE PRN MG: 500 TABLET, CHEWABLE ORAL at 09:04

## 2021-09-04 RX ADMIN — Medication SCH ML: at 21:15

## 2021-09-04 RX ADMIN — ASPIRIN 81 MG CHEWABLE TABLET SCH MG: 81 TABLET CHEWABLE at 09:05

## 2021-09-04 RX ADMIN — Medication SCH ML: at 09:06

## 2021-09-05 RX ADMIN — Medication SCH ML: at 21:01

## 2021-09-05 RX ADMIN — Medication SCH ML: at 08:44

## 2021-09-05 RX ADMIN — ASPIRIN 81 MG CHEWABLE TABLET SCH MG: 81 TABLET CHEWABLE at 08:43

## 2021-09-06 RX ADMIN — Medication SCH ML: at 08:47

## 2021-09-06 RX ADMIN — ASPIRIN 81 MG CHEWABLE TABLET SCH MG: 81 TABLET CHEWABLE at 08:47

## 2021-09-06 RX ADMIN — Medication SCH ML: at 20:48

## 2021-09-07 LAB
ANION GAP SERPL CALC-SCNC: 14 MMOL/L (ref 10–20)
BASOPHILS # BLD AUTO: 0 THOU/UL (ref 0–0.2)
BASOPHILS NFR BLD AUTO: 0.2 % (ref 0–1)
BUN SERPL-MCNC: 7 MG/DL (ref 9.8–20.1)
CALCIUM SERPL-MCNC: 9.6 MG/DL (ref 7.8–10.44)
CHLORIDE SERPL-SCNC: 102 MMOL/L (ref 98–107)
CO2 SERPL-SCNC: 24 MMOL/L (ref 22–29)
CREAT CL PREDICTED SERPL C-G-VRATE: 174 ML/MIN (ref 70–130)
EOSINOPHIL # BLD AUTO: 0.2 THOU/UL (ref 0–0.7)
EOSINOPHIL NFR BLD AUTO: 1.3 % (ref 0–10)
GLUCOSE SERPL-MCNC: 127 MG/DL (ref 70–105)
HGB BLD-MCNC: 10.3 G/DL (ref 12–16)
LYMPHOCYTES # BLD: 2.4 THOU/UL (ref 1.2–3.4)
LYMPHOCYTES NFR BLD AUTO: 20.6 % (ref 21–51)
MCH RBC QN AUTO: 28.5 PG (ref 27–31)
MCV RBC AUTO: 87.1 FL (ref 78–98)
MONOCYTES # BLD AUTO: 1 THOU/UL (ref 0.11–0.59)
MONOCYTES NFR BLD AUTO: 8.7 % (ref 0–10)
NEUTROPHILS # BLD AUTO: 8.1 THOU/UL (ref 1.4–6.5)
NEUTROPHILS NFR BLD AUTO: 69.2 % (ref 42–75)
PLATELET # BLD AUTO: 379 THOU/UL (ref 130–400)
POTASSIUM SERPL-SCNC: 3.8 MMOL/L (ref 3.5–5.1)
RBC # BLD AUTO: 3.6 MILL/UL (ref 4.2–5.4)
SODIUM SERPL-SCNC: 136 MMOL/L (ref 136–145)
WBC # BLD AUTO: 11.7 THOU/UL (ref 4.8–10.8)

## 2021-09-07 RX ADMIN — ASPIRIN 81 MG CHEWABLE TABLET SCH MG: 81 TABLET CHEWABLE at 08:27

## 2021-09-07 RX ADMIN — Medication SCH: at 20:37

## 2021-09-07 RX ADMIN — Medication SCH: at 19:39

## 2021-09-08 RX ADMIN — Medication SCH: at 20:01

## 2021-09-08 RX ADMIN — Medication SCH: at 10:06

## 2021-09-08 RX ADMIN — ASPIRIN 81 MG CHEWABLE TABLET SCH MG: 81 TABLET CHEWABLE at 10:01

## 2021-09-09 RX ADMIN — ASPIRIN 81 MG CHEWABLE TABLET SCH MG: 81 TABLET CHEWABLE at 07:55

## 2021-09-09 RX ADMIN — Medication SCH: at 20:00

## 2021-09-09 RX ADMIN — Medication SCH: at 17:02

## 2021-09-10 RX ADMIN — Medication SCH: at 21:59

## 2021-09-10 RX ADMIN — ASPIRIN 81 MG CHEWABLE TABLET SCH MG: 81 TABLET CHEWABLE at 07:38

## 2021-09-10 RX ADMIN — Medication SCH: at 15:32

## 2021-09-11 RX ADMIN — ASPIRIN 81 MG CHEWABLE TABLET SCH MG: 81 TABLET CHEWABLE at 08:58

## 2021-09-11 RX ADMIN — Medication SCH: at 08:59

## 2021-09-11 RX ADMIN — Medication SCH: at 21:57

## 2021-09-12 RX ADMIN — ASPIRIN 81 MG CHEWABLE TABLET SCH MG: 81 TABLET CHEWABLE at 10:23

## 2021-09-12 RX ADMIN — Medication SCH: at 21:59

## 2021-09-12 RX ADMIN — Medication SCH: at 10:23

## 2021-09-13 RX ADMIN — Medication SCH: at 09:46

## 2021-09-13 RX ADMIN — ASPIRIN 81 MG CHEWABLE TABLET SCH MG: 81 TABLET CHEWABLE at 09:45

## 2021-09-13 RX ADMIN — Medication SCH: at 20:53

## 2021-09-14 RX ADMIN — Medication SCH: at 08:13

## 2021-09-14 RX ADMIN — ASPIRIN 81 MG CHEWABLE TABLET SCH MG: 81 TABLET CHEWABLE at 08:12

## 2021-09-14 RX ADMIN — Medication SCH ML: at 21:46

## 2021-09-15 VITALS — DIASTOLIC BLOOD PRESSURE: 68 MMHG | SYSTOLIC BLOOD PRESSURE: 114 MMHG | TEMPERATURE: 98 F

## 2021-09-15 RX ADMIN — ASPIRIN 81 MG CHEWABLE TABLET SCH MG: 81 TABLET CHEWABLE at 09:28

## 2021-09-15 RX ADMIN — Medication SCH: at 09:29

## 2022-01-16 ENCOUNTER — HOSPITAL ENCOUNTER (EMERGENCY)
Dept: HOSPITAL 92 - CSHERS | Age: 62
Discharge: HOME | End: 2022-01-16
Payer: COMMERCIAL

## 2022-01-16 DIAGNOSIS — Z79.82: ICD-10-CM

## 2022-01-16 DIAGNOSIS — I25.2: ICD-10-CM

## 2022-01-16 DIAGNOSIS — I50.9: ICD-10-CM

## 2022-01-16 DIAGNOSIS — L03.115: Primary | ICD-10-CM

## 2022-01-16 DIAGNOSIS — Z79.01: ICD-10-CM

## 2022-01-16 DIAGNOSIS — J44.9: ICD-10-CM

## 2022-01-16 DIAGNOSIS — Z79.899: ICD-10-CM

## 2022-01-16 DIAGNOSIS — F17.210: ICD-10-CM

## 2022-01-16 LAB
ALBUMIN SERPL BCG-MCNC: 3.5 G/DL (ref 3.5–5)
ALP SERPL-CCNC: 87 U/L (ref 40–110)
ALT SERPL W P-5'-P-CCNC: 10 U/L (ref 8–55)
ANION GAP SERPL CALC-SCNC: 16 MMOL/L (ref 10–20)
AST SERPL-CCNC: 12 U/L (ref 5–34)
BASOPHILS # BLD AUTO: 0 10X3/UL (ref 0–0.2)
BASOPHILS NFR BLD AUTO: 0.1 % (ref 0–2)
BILIRUB SERPL-MCNC: 0.5 MG/DL (ref 0.2–1.2)
BUN SERPL-MCNC: 10 MG/DL (ref 9.8–20.1)
CALCIUM SERPL-MCNC: 9.9 MG/DL (ref 7.8–10.44)
CHLORIDE SERPL-SCNC: 99 MMOL/L (ref 98–107)
CO2 SERPL-SCNC: 24 MMOL/L (ref 22–29)
CREAT CL PREDICTED SERPL C-G-VRATE: 0 ML/MIN (ref 70–130)
EOSINOPHIL # BLD AUTO: 0.1 10X3/UL (ref 0–0.5)
EOSINOPHIL NFR BLD AUTO: 0.4 % (ref 0–6)
GLOBULIN SER CALC-MCNC: 3.1 G/DL (ref 2.4–3.5)
GLUCOSE SERPL-MCNC: 146 MG/DL (ref 70–105)
HGB BLD-MCNC: 10.7 G/DL (ref 12–15.5)
LYMPHOCYTES NFR BLD AUTO: 9.8 % (ref 18–47)
MCH RBC QN AUTO: 27.4 PG (ref 27–33)
MCV RBC AUTO: 87.4 FL (ref 81.6–98.3)
MONOCYTES # BLD AUTO: 1.2 10X3/UL (ref 0–1.1)
MONOCYTES NFR BLD AUTO: 7.7 % (ref 0–10)
NEUTROPHILS # BLD AUTO: 13 10X3/UL (ref 1.5–8.4)
NEUTROPHILS NFR BLD AUTO: 81.4 % (ref 40–75)
PLATELET # BLD AUTO: 264 10X3/UL (ref 150–450)
POTASSIUM SERPL-SCNC: 3.4 MMOL/L (ref 3.5–5.1)
RBC # BLD AUTO: 3.9 10X6/UL (ref 3.9–5.03)
SODIUM SERPL-SCNC: 136 MMOL/L (ref 136–145)
WBC # BLD AUTO: 15.9 10X3/UL (ref 3.5–10.5)

## 2022-01-16 PROCEDURE — 80053 COMPREHEN METABOLIC PANEL: CPT

## 2022-01-16 PROCEDURE — 85025 COMPLETE CBC W/AUTO DIFF WBC: CPT

## 2022-01-22 ENCOUNTER — HOSPITAL ENCOUNTER (INPATIENT)
Dept: HOSPITAL 92 - CSHERS | Age: 62
LOS: 5 days | Discharge: NURSING, REHAB OR LONG-TERM CARE | DRG: 637 | End: 2022-01-27
Attending: STUDENT IN AN ORGANIZED HEALTH CARE EDUCATION/TRAINING PROGRAM | Admitting: STUDENT IN AN ORGANIZED HEALTH CARE EDUCATION/TRAINING PROGRAM
Payer: SELF-PAY

## 2022-01-22 VITALS — BODY MASS INDEX: 60.4 KG/M2

## 2022-01-22 DIAGNOSIS — J44.9: ICD-10-CM

## 2022-01-22 DIAGNOSIS — I25.10: ICD-10-CM

## 2022-01-22 DIAGNOSIS — I48.91: ICD-10-CM

## 2022-01-22 DIAGNOSIS — E66.01: ICD-10-CM

## 2022-01-22 DIAGNOSIS — E78.5: ICD-10-CM

## 2022-01-22 DIAGNOSIS — Z87.891: ICD-10-CM

## 2022-01-22 DIAGNOSIS — R19.7: ICD-10-CM

## 2022-01-22 DIAGNOSIS — D64.9: ICD-10-CM

## 2022-01-22 DIAGNOSIS — E11.628: Primary | ICD-10-CM

## 2022-01-22 DIAGNOSIS — Z79.899: ICD-10-CM

## 2022-01-22 DIAGNOSIS — F32.A: ICD-10-CM

## 2022-01-22 DIAGNOSIS — Z95.5: ICD-10-CM

## 2022-01-22 DIAGNOSIS — Z79.01: ICD-10-CM

## 2022-01-22 DIAGNOSIS — I50.32: ICD-10-CM

## 2022-01-22 DIAGNOSIS — Z79.82: ICD-10-CM

## 2022-01-22 DIAGNOSIS — E11.9: ICD-10-CM

## 2022-01-22 DIAGNOSIS — I11.0: ICD-10-CM

## 2022-01-22 DIAGNOSIS — U07.1: ICD-10-CM

## 2022-01-22 DIAGNOSIS — E87.6: ICD-10-CM

## 2022-01-22 DIAGNOSIS — L03.115: ICD-10-CM

## 2022-01-22 DIAGNOSIS — R91.1: ICD-10-CM

## 2022-01-22 LAB
ALBUMIN SERPL BCG-MCNC: 3.3 G/DL (ref 3.5–5)
ALP SERPL-CCNC: 65 U/L (ref 40–110)
ALT SERPL W P-5'-P-CCNC: 8 U/L (ref 8–55)
ANION GAP SERPL CALC-SCNC: 12 MMOL/L (ref 10–20)
AST SERPL-CCNC: 5 U/L (ref 5–34)
BASOPHILS # BLD AUTO: 0 10X3/UL (ref 0–0.2)
BASOPHILS NFR BLD AUTO: 0.2 % (ref 0–2)
BILIRUB SERPL-MCNC: 0.2 MG/DL (ref 0.2–1.2)
BUN SERPL-MCNC: 6 MG/DL (ref 9.8–20.1)
CALCIUM SERPL-MCNC: 9.7 MG/DL (ref 7.8–10.44)
CHLORIDE SERPL-SCNC: 104 MMOL/L (ref 98–107)
CO2 SERPL-SCNC: 30 MMOL/L (ref 22–29)
CREAT CL PREDICTED SERPL C-G-VRATE: 0 ML/MIN (ref 70–130)
EOSINOPHIL # BLD AUTO: 0.2 10X3/UL (ref 0–0.5)
EOSINOPHIL NFR BLD AUTO: 1.3 % (ref 0–6)
GLOBULIN SER CALC-MCNC: 3.2 G/DL (ref 2.4–3.5)
GLUCOSE SERPL-MCNC: 148 MG/DL (ref 70–105)
HGB BLD-MCNC: 9.9 G/DL (ref 12–15.5)
LYMPHOCYTES NFR BLD AUTO: 19.4 % (ref 18–47)
MCH RBC QN AUTO: 27.1 PG (ref 27–33)
MCV RBC AUTO: 88.5 FL (ref 81.6–98.3)
MONOCYTES # BLD AUTO: 0.7 10X3/UL (ref 0–1.1)
MONOCYTES NFR BLD AUTO: 5.8 % (ref 0–10)
NEUTROPHILS # BLD AUTO: 8.4 10X3/UL (ref 1.5–8.4)
NEUTROPHILS NFR BLD AUTO: 70.6 % (ref 40–75)
PLATELET # BLD AUTO: 344 10X3/UL (ref 150–450)
POTASSIUM SERPL-SCNC: 3.2 MMOL/L (ref 3.5–5.1)
RBC # BLD AUTO: 3.65 10X6/UL (ref 3.9–5.03)
SODIUM SERPL-SCNC: 143 MMOL/L (ref 136–145)
WBC # BLD AUTO: 11.9 10X3/UL (ref 3.5–10.5)

## 2022-01-22 PROCEDURE — 84100 ASSAY OF PHOSPHORUS: CPT

## 2022-01-22 PROCEDURE — U0005 INFEC AGEN DETEC AMPLI PROBE: HCPCS

## 2022-01-22 PROCEDURE — 96366 THER/PROPH/DIAG IV INF ADDON: CPT

## 2022-01-22 PROCEDURE — 80202 ASSAY OF VANCOMYCIN: CPT

## 2022-01-22 PROCEDURE — 94760 N-INVAS EAR/PLS OXIMETRY 1: CPT

## 2022-01-22 PROCEDURE — 80053 COMPREHEN METABOLIC PANEL: CPT

## 2022-01-22 PROCEDURE — 87040 BLOOD CULTURE FOR BACTERIA: CPT

## 2022-01-22 PROCEDURE — 36415 COLL VENOUS BLD VENIPUNCTURE: CPT

## 2022-01-22 PROCEDURE — 36416 COLLJ CAPILLARY BLOOD SPEC: CPT

## 2022-01-22 PROCEDURE — 96367 TX/PROPH/DG ADDL SEQ IV INF: CPT

## 2022-01-22 PROCEDURE — 80048 BASIC METABOLIC PNL TOTAL CA: CPT

## 2022-01-22 PROCEDURE — 96365 THER/PROPH/DIAG IV INF INIT: CPT

## 2022-01-22 PROCEDURE — 83735 ASSAY OF MAGNESIUM: CPT

## 2022-01-22 PROCEDURE — 94664 DEMO&/EVAL PT USE INHALER: CPT

## 2022-01-22 PROCEDURE — 83605 ASSAY OF LACTIC ACID: CPT

## 2022-01-22 PROCEDURE — 85652 RBC SED RATE AUTOMATED: CPT

## 2022-01-22 PROCEDURE — 86140 C-REACTIVE PROTEIN: CPT

## 2022-01-22 PROCEDURE — U0003 INFECTIOUS AGENT DETECTION BY NUCLEIC ACID (DNA OR RNA); SEVERE ACUTE RESPIRATORY SYNDROME CORONAVIRUS 2 (SARS-COV-2) (CORONAVIRUS DISEASE [COVID-19]), AMPLIFIED PROBE TECHNIQUE, MAKING USE OF HIGH THROUGHPUT TECHNOLOGIES AS DESCRIBED BY CMS-2020-01-R: HCPCS

## 2022-01-22 PROCEDURE — 8E0ZXY6 ISOLATION: ICD-10-PCS | Performed by: STUDENT IN AN ORGANIZED HEALTH CARE EDUCATION/TRAINING PROGRAM

## 2022-01-22 PROCEDURE — 85025 COMPLETE CBC W/AUTO DIFF WBC: CPT

## 2022-01-22 PROCEDURE — 83036 HEMOGLOBIN GLYCOSYLATED A1C: CPT

## 2022-01-22 RX ADMIN — HYDROCODONE BITARTRATE AND ACETAMINOPHEN PRN TAB: 5; 325 TABLET ORAL at 23:08

## 2022-01-22 RX ADMIN — HYDROCODONE BITARTRATE AND ACETAMINOPHEN PRN TAB: 5; 325 TABLET ORAL at 17:32

## 2022-01-22 RX ADMIN — MOMETASONE FUROATE AND FORMOTEROL FUMARATE DIHYDRATE SCH: 200; 5 AEROSOL RESPIRATORY (INHALATION) at 20:11

## 2022-01-23 LAB
ANION GAP SERPL CALC-SCNC: 14 MMOL/L (ref 10–20)
BASOPHILS # BLD AUTO: 0.1 10X3/UL (ref 0–0.2)
BASOPHILS NFR BLD AUTO: 0.4 % (ref 0–2)
BUN SERPL-MCNC: 11 MG/DL (ref 9.8–20.1)
CALCIUM SERPL-MCNC: 9.6 MG/DL (ref 7.8–10.44)
CHLORIDE SERPL-SCNC: 105 MMOL/L (ref 98–107)
CO2 SERPL-SCNC: 26 MMOL/L (ref 22–29)
CREAT CL PREDICTED SERPL C-G-VRATE: 203 ML/MIN (ref 70–130)
EOSINOPHIL # BLD AUTO: 0.2 10X3/UL (ref 0–0.5)
EOSINOPHIL NFR BLD AUTO: 1.3 % (ref 0–6)
GLUCOSE SERPL-MCNC: 151 MG/DL (ref 70–105)
HGB BLD-MCNC: 10.3 G/DL (ref 12–15.5)
LYMPHOCYTES NFR BLD AUTO: 20.1 % (ref 18–47)
MAGNESIUM SERPL-MCNC: 1.8 MG/DL (ref 1.6–2.6)
MCH RBC QN AUTO: 27.3 PG (ref 27–33)
MCV RBC AUTO: 89.9 FL (ref 81.6–98.3)
MONOCYTES # BLD AUTO: 1 10X3/UL (ref 0–1.1)
MONOCYTES NFR BLD AUTO: 8.2 % (ref 0–10)
NEUTROPHILS # BLD AUTO: 7.9 10X3/UL (ref 1.5–8.4)
NEUTROPHILS NFR BLD AUTO: 66.5 % (ref 40–75)
PLATELET # BLD AUTO: 353 10X3/UL (ref 150–450)
POTASSIUM SERPL-SCNC: 3.9 MMOL/L (ref 3.5–5.1)
RBC # BLD AUTO: 3.77 10X6/UL (ref 3.9–5.03)
SARS-COV-2 RNA RESP QL NAA+PROBE: DETECTED
SODIUM SERPL-SCNC: 141 MMOL/L (ref 136–145)
WBC # BLD AUTO: 11.9 10X3/UL (ref 3.5–10.5)

## 2022-01-23 RX ADMIN — HYDROCODONE BITARTRATE AND ACETAMINOPHEN PRN TAB: 5; 325 TABLET ORAL at 21:31

## 2022-01-23 RX ADMIN — VANCOMYCIN HYDROCHLORIDE SCH MLS: 1 INJECTION, POWDER, LYOPHILIZED, FOR SOLUTION INTRAVENOUS at 10:00

## 2022-01-23 RX ADMIN — MOMETASONE FUROATE AND FORMOTEROL FUMARATE DIHYDRATE SCH PUFF: 200; 5 AEROSOL RESPIRATORY (INHALATION) at 09:15

## 2022-01-23 RX ADMIN — HYDROCODONE BITARTRATE AND ACETAMINOPHEN PRN TAB: 5; 325 TABLET ORAL at 15:23

## 2022-01-23 RX ADMIN — MOMETASONE FUROATE AND FORMOTEROL FUMARATE DIHYDRATE SCH PUFF: 200; 5 AEROSOL RESPIRATORY (INHALATION) at 19:45

## 2022-01-23 RX ADMIN — ASPIRIN SCH MG: 81 TABLET ORAL at 09:12

## 2022-01-23 RX ADMIN — CEFTRIAXONE SCH MLS: 2 INJECTION, POWDER, FOR SOLUTION INTRAMUSCULAR; INTRAVENOUS at 13:18

## 2022-01-23 RX ADMIN — HYDROCODONE BITARTRATE AND ACETAMINOPHEN PRN TAB: 5; 325 TABLET ORAL at 09:14

## 2022-01-23 RX ADMIN — VANCOMYCIN HYDROCHLORIDE SCH MLS: 1 INJECTION, POWDER, LYOPHILIZED, FOR SOLUTION INTRAVENOUS at 18:59

## 2022-01-24 LAB
ANION GAP SERPL CALC-SCNC: 12 MMOL/L (ref 10–20)
BASOPHILS # BLD AUTO: 0 10X3/UL (ref 0–0.2)
BASOPHILS NFR BLD AUTO: 0.3 % (ref 0–2)
BUN SERPL-MCNC: 9 MG/DL (ref 9.8–20.1)
CALCIUM SERPL-MCNC: 9.6 MG/DL (ref 7.8–10.44)
CHLORIDE SERPL-SCNC: 101 MMOL/L (ref 98–107)
CO2 SERPL-SCNC: 28 MMOL/L (ref 22–29)
CREAT CL PREDICTED SERPL C-G-VRATE: 213 ML/MIN (ref 70–130)
EOSINOPHIL # BLD AUTO: 0.2 10X3/UL (ref 0–0.5)
EOSINOPHIL NFR BLD AUTO: 1.4 % (ref 0–6)
GLUCOSE SERPL-MCNC: 175 MG/DL (ref 70–105)
HGB BLD-MCNC: 10.7 G/DL (ref 12–15.5)
LYMPHOCYTES NFR BLD AUTO: 18.5 % (ref 18–47)
MCH RBC QN AUTO: 27.6 PG (ref 27–33)
MCV RBC AUTO: 88.6 FL (ref 81.6–98.3)
MONOCYTES # BLD AUTO: 0.6 10X3/UL (ref 0–1.1)
MONOCYTES NFR BLD AUTO: 5.6 % (ref 0–10)
NEUTROPHILS # BLD AUTO: 8 10X3/UL (ref 1.5–8.4)
NEUTROPHILS NFR BLD AUTO: 71.9 % (ref 40–75)
PLATELET # BLD AUTO: 324 10X3/UL (ref 150–450)
POTASSIUM SERPL-SCNC: 3.4 MMOL/L (ref 3.5–5.1)
RBC # BLD AUTO: 3.87 10X6/UL (ref 3.9–5.03)
SODIUM SERPL-SCNC: 138 MMOL/L (ref 136–145)
VANCOMYCIN TROUGH SERPL-MCNC: 14.1 UG/ML
WBC # BLD AUTO: 11.1 10X3/UL (ref 3.5–10.5)

## 2022-01-24 RX ADMIN — HYDROCODONE BITARTRATE AND ACETAMINOPHEN PRN TAB: 5; 325 TABLET ORAL at 04:31

## 2022-01-24 RX ADMIN — HYDROCODONE BITARTRATE AND ACETAMINOPHEN PRN TAB: 5; 325 TABLET ORAL at 21:44

## 2022-01-24 RX ADMIN — VANCOMYCIN HYDROCHLORIDE SCH MLS: 1 INJECTION, POWDER, LYOPHILIZED, FOR SOLUTION INTRAVENOUS at 14:02

## 2022-01-24 RX ADMIN — VANCOMYCIN HYDROCHLORIDE SCH: 1 INJECTION, POWDER, LYOPHILIZED, FOR SOLUTION INTRAVENOUS at 14:32

## 2022-01-24 RX ADMIN — MOMETASONE FUROATE AND FORMOTEROL FUMARATE DIHYDRATE SCH PUFF: 200; 5 AEROSOL RESPIRATORY (INHALATION) at 07:37

## 2022-01-24 RX ADMIN — HYDROCODONE BITARTRATE AND ACETAMINOPHEN PRN TAB: 5; 325 TABLET ORAL at 11:20

## 2022-01-24 RX ADMIN — VANCOMYCIN HYDROCHLORIDE SCH MLS: 1 INJECTION, POWDER, LYOPHILIZED, FOR SOLUTION INTRAVENOUS at 03:53

## 2022-01-24 RX ADMIN — CEFTRIAXONE SCH MLS: 2 INJECTION, POWDER, FOR SOLUTION INTRAMUSCULAR; INTRAVENOUS at 16:19

## 2022-01-24 RX ADMIN — HYDROCODONE BITARTRATE AND ACETAMINOPHEN PRN TAB: 5; 325 TABLET ORAL at 16:18

## 2022-01-24 RX ADMIN — VANCOMYCIN HYDROCHLORIDE SCH MLS: 1 INJECTION, POWDER, LYOPHILIZED, FOR SOLUTION INTRAVENOUS at 21:41

## 2022-01-24 RX ADMIN — ASPIRIN SCH MG: 81 TABLET ORAL at 11:22

## 2022-01-24 RX ADMIN — MOMETASONE FUROATE AND FORMOTEROL FUMARATE DIHYDRATE SCH PUFF: 200; 5 AEROSOL RESPIRATORY (INHALATION) at 19:46

## 2022-01-25 LAB
ANION GAP SERPL CALC-SCNC: 14 MMOL/L (ref 10–20)
BASOPHILS # BLD AUTO: 0.1 10X3/UL (ref 0–0.2)
BASOPHILS NFR BLD AUTO: 0.4 % (ref 0–2)
BUN SERPL-MCNC: 12 MG/DL (ref 9.8–20.1)
CALCIUM SERPL-MCNC: 9.5 MG/DL (ref 7.8–10.44)
CHLORIDE SERPL-SCNC: 104 MMOL/L (ref 98–107)
CO2 SERPL-SCNC: 26 MMOL/L (ref 22–29)
CREAT CL PREDICTED SERPL C-G-VRATE: 203 ML/MIN (ref 70–130)
EOSINOPHIL # BLD AUTO: 0.1 10X3/UL (ref 0–0.5)
EOSINOPHIL NFR BLD AUTO: 1.1 % (ref 0–6)
GLUCOSE SERPL-MCNC: 125 MG/DL (ref 70–105)
HGB BLD-MCNC: 10.6 G/DL (ref 12–15.5)
LYMPHOCYTES NFR BLD AUTO: 18.4 % (ref 18–47)
MAGNESIUM SERPL-MCNC: 2.2 MG/DL (ref 1.6–2.6)
MCH RBC QN AUTO: 27.3 PG (ref 27–33)
MCV RBC AUTO: 88.9 FL (ref 81.6–98.3)
MONOCYTES # BLD AUTO: 0.7 10X3/UL (ref 0–1.1)
MONOCYTES NFR BLD AUTO: 5.6 % (ref 0–10)
NEUTROPHILS # BLD AUTO: 8.8 10X3/UL (ref 1.5–8.4)
NEUTROPHILS NFR BLD AUTO: 72.3 % (ref 40–75)
PLATELET # BLD AUTO: 363 10X3/UL (ref 150–450)
POTASSIUM SERPL-SCNC: 3.8 MMOL/L (ref 3.5–5.1)
RBC # BLD AUTO: 3.88 10X6/UL (ref 3.9–5.03)
SODIUM SERPL-SCNC: 140 MMOL/L (ref 136–145)
VANCOMYCIN TROUGH SERPL-MCNC: 19.5 UG/ML
WBC # BLD AUTO: 12.2 10X3/UL (ref 3.5–10.5)

## 2022-01-25 RX ADMIN — ASPIRIN SCH MG: 81 TABLET ORAL at 08:11

## 2022-01-25 RX ADMIN — HYDROCODONE BITARTRATE AND ACETAMINOPHEN PRN TAB: 5; 325 TABLET ORAL at 04:55

## 2022-01-25 RX ADMIN — VANCOMYCIN HYDROCHLORIDE SCH MLS: 1 INJECTION, POWDER, LYOPHILIZED, FOR SOLUTION INTRAVENOUS at 04:40

## 2022-01-25 RX ADMIN — CEFTRIAXONE SCH MLS: 2 INJECTION, POWDER, FOR SOLUTION INTRAMUSCULAR; INTRAVENOUS at 11:27

## 2022-01-25 RX ADMIN — MOMETASONE FUROATE AND FORMOTEROL FUMARATE DIHYDRATE SCH PUFF: 200; 5 AEROSOL RESPIRATORY (INHALATION) at 08:02

## 2022-01-25 RX ADMIN — MOMETASONE FUROATE AND FORMOTEROL FUMARATE DIHYDRATE SCH PUFF: 200; 5 AEROSOL RESPIRATORY (INHALATION) at 20:05

## 2022-01-25 RX ADMIN — HYDROCODONE BITARTRATE AND ACETAMINOPHEN PRN TAB: 5; 325 TABLET ORAL at 18:25

## 2022-01-25 RX ADMIN — HYDROCODONE BITARTRATE AND ACETAMINOPHEN PRN TAB: 5; 325 TABLET ORAL at 23:29

## 2022-01-25 RX ADMIN — HYDROCODONE BITARTRATE AND ACETAMINOPHEN PRN TAB: 5; 325 TABLET ORAL at 11:26

## 2022-01-25 RX ADMIN — VANCOMYCIN HYDROCHLORIDE SCH MLS: 1 INJECTION, POWDER, LYOPHILIZED, FOR SOLUTION INTRAVENOUS at 14:24

## 2022-01-26 LAB
ANION GAP SERPL CALC-SCNC: 15 MMOL/L (ref 10–20)
BASOPHILS # BLD AUTO: 0 10X3/UL (ref 0–0.2)
BASOPHILS NFR BLD AUTO: 0.3 % (ref 0–2)
BUN SERPL-MCNC: 11 MG/DL (ref 9.8–20.1)
CALCIUM SERPL-MCNC: 9.7 MG/DL (ref 7.8–10.44)
CHLORIDE SERPL-SCNC: 105 MMOL/L (ref 98–107)
CO2 SERPL-SCNC: 24 MMOL/L (ref 22–29)
CREAT CL PREDICTED SERPL C-G-VRATE: 209 ML/MIN (ref 70–130)
EOSINOPHIL # BLD AUTO: 0.2 10X3/UL (ref 0–0.5)
EOSINOPHIL NFR BLD AUTO: 1.3 % (ref 0–6)
GLUCOSE SERPL-MCNC: 138 MG/DL (ref 70–105)
HGB BLD-MCNC: 10.7 G/DL (ref 12–15.5)
LYMPHOCYTES NFR BLD AUTO: 17.1 % (ref 18–47)
MCH RBC QN AUTO: 26.8 PG (ref 27–33)
MCV RBC AUTO: 88.3 FL (ref 81.6–98.3)
MONOCYTES # BLD AUTO: 1.1 10X3/UL (ref 0–1.1)
MONOCYTES NFR BLD AUTO: 7.4 % (ref 0–10)
NEUTROPHILS # BLD AUTO: 10.2 10X3/UL (ref 1.5–8.4)
NEUTROPHILS NFR BLD AUTO: 72.3 % (ref 40–75)
PLATELET # BLD AUTO: 376 10X3/UL (ref 150–450)
POTASSIUM SERPL-SCNC: 4 MMOL/L (ref 3.5–5.1)
RBC # BLD AUTO: 4 10X6/UL (ref 3.9–5.03)
SODIUM SERPL-SCNC: 140 MMOL/L (ref 136–145)
WBC # BLD AUTO: 14.1 10X3/UL (ref 3.5–10.5)

## 2022-01-26 RX ADMIN — HYDROCODONE BITARTRATE AND ACETAMINOPHEN PRN TAB: 5; 325 TABLET ORAL at 18:48

## 2022-01-26 RX ADMIN — CEFTRIAXONE SCH MLS: 2 INJECTION, POWDER, FOR SOLUTION INTRAMUSCULAR; INTRAVENOUS at 11:03

## 2022-01-26 RX ADMIN — HYDROCODONE BITARTRATE AND ACETAMINOPHEN PRN TAB: 5; 325 TABLET ORAL at 03:51

## 2022-01-26 RX ADMIN — HYDROCODONE BITARTRATE AND ACETAMINOPHEN PRN TAB: 5; 325 TABLET ORAL at 11:01

## 2022-01-26 RX ADMIN — ASPIRIN SCH MG: 81 TABLET ORAL at 11:03

## 2022-01-26 RX ADMIN — VANCOMYCIN HYDROCHLORIDE SCH MLS: 1 INJECTION, POWDER, LYOPHILIZED, FOR SOLUTION INTRAVENOUS at 14:39

## 2022-01-26 RX ADMIN — MOMETASONE FUROATE AND FORMOTEROL FUMARATE DIHYDRATE SCH PUFF: 200; 5 AEROSOL RESPIRATORY (INHALATION) at 19:36

## 2022-01-26 RX ADMIN — MOMETASONE FUROATE AND FORMOTEROL FUMARATE DIHYDRATE SCH PUFF: 200; 5 AEROSOL RESPIRATORY (INHALATION) at 07:44

## 2022-01-26 RX ADMIN — HYDROCODONE BITARTRATE AND ACETAMINOPHEN PRN TAB: 5; 325 TABLET ORAL at 14:40

## 2022-01-26 RX ADMIN — VANCOMYCIN HYDROCHLORIDE SCH MLS: 1 INJECTION, POWDER, LYOPHILIZED, FOR SOLUTION INTRAVENOUS at 01:20

## 2022-01-27 VITALS — SYSTOLIC BLOOD PRESSURE: 110 MMHG | TEMPERATURE: 97.1 F | DIASTOLIC BLOOD PRESSURE: 52 MMHG

## 2022-01-27 LAB
ANION GAP SERPL CALC-SCNC: 13 MMOL/L (ref 10–20)
BASOPHILS # BLD AUTO: 0.1 10X3/UL (ref 0–0.2)
BASOPHILS NFR BLD AUTO: 0.4 % (ref 0–2)
BUN SERPL-MCNC: 11 MG/DL (ref 9.8–20.1)
CALCIUM SERPL-MCNC: 9.5 MG/DL (ref 7.8–10.44)
CHLORIDE SERPL-SCNC: 104 MMOL/L (ref 98–107)
CO2 SERPL-SCNC: 26 MMOL/L (ref 22–29)
CREAT CL PREDICTED SERPL C-G-VRATE: 213 ML/MIN (ref 70–130)
EOSINOPHIL # BLD AUTO: 0.2 10X3/UL (ref 0–0.5)
EOSINOPHIL NFR BLD AUTO: 1.4 % (ref 0–6)
GLUCOSE SERPL-MCNC: 151 MG/DL (ref 70–105)
HGB BLD-MCNC: 10 G/DL (ref 12–15.5)
LYMPHOCYTES NFR BLD AUTO: 17.3 % (ref 18–47)
MCH RBC QN AUTO: 27.2 PG (ref 27–33)
MCV RBC AUTO: 88.6 FL (ref 81.6–98.3)
MONOCYTES # BLD AUTO: 0.9 10X3/UL (ref 0–1.1)
MONOCYTES NFR BLD AUTO: 6.3 % (ref 0–10)
NEUTROPHILS # BLD AUTO: 10.3 10X3/UL (ref 1.5–8.4)
NEUTROPHILS NFR BLD AUTO: 73.3 % (ref 40–75)
PLATELET # BLD AUTO: 389 10X3/UL (ref 150–450)
POTASSIUM SERPL-SCNC: 4.3 MMOL/L (ref 3.5–5.1)
RBC # BLD AUTO: 3.68 10X6/UL (ref 3.9–5.03)
SODIUM SERPL-SCNC: 139 MMOL/L (ref 136–145)
VANCOMYCIN TROUGH SERPL-MCNC: 18.2 UG/ML
WBC # BLD AUTO: 14 10X3/UL (ref 3.5–10.5)

## 2022-01-27 RX ADMIN — VANCOMYCIN HYDROCHLORIDE SCH MLS: 1 INJECTION, POWDER, LYOPHILIZED, FOR SOLUTION INTRAVENOUS at 01:06

## 2022-01-27 RX ADMIN — MOMETASONE FUROATE AND FORMOTEROL FUMARATE DIHYDRATE SCH PUFF: 200; 5 AEROSOL RESPIRATORY (INHALATION) at 07:38

## 2022-01-27 RX ADMIN — HYDROCODONE BITARTRATE AND ACETAMINOPHEN PRN TAB: 5; 325 TABLET ORAL at 10:58

## 2022-01-27 RX ADMIN — VANCOMYCIN HYDROCHLORIDE SCH MLS: 1 INJECTION, POWDER, LYOPHILIZED, FOR SOLUTION INTRAVENOUS at 13:55

## 2022-01-27 RX ADMIN — ASPIRIN SCH MG: 81 TABLET ORAL at 10:57

## 2022-01-27 RX ADMIN — CEFTRIAXONE SCH MLS: 2 INJECTION, POWDER, FOR SOLUTION INTRAMUSCULAR; INTRAVENOUS at 11:00

## 2022-01-27 RX ADMIN — HYDROCODONE BITARTRATE AND ACETAMINOPHEN PRN TAB: 5; 325 TABLET ORAL at 00:51

## 2022-06-27 ENCOUNTER — HOSPITAL ENCOUNTER (OUTPATIENT)
Dept: HOSPITAL 92 - BICRAD | Age: 62
Discharge: HOME | End: 2022-06-27
Payer: COMMERCIAL

## 2022-06-27 DIAGNOSIS — Z02.71: Primary | ICD-10-CM

## 2022-06-27 DIAGNOSIS — I51.7: ICD-10-CM

## 2022-06-27 PROCEDURE — 71046 X-RAY EXAM CHEST 2 VIEWS: CPT

## 2025-01-08 NOTE — PDOC.HOSPP
- Subjective


Encounter Date: 09/17/20


Encounter Time: 09:40


Subjective: 


Patient seen in follow-up for acute hypoxic and hypercapnic respiratory failure.

 Patient is currently intubated, could not complete review of systems





- Objective


Vital Signs & Weight: 


                             Vital Signs (12 hours)











  Temp Pulse Resp Pulse Ox


 


 09/17/20 10:47   82  


 


 09/17/20 10:00    21 H 


 


 09/17/20 08:00  98.9 F   18  94 L


 


 09/17/20 07:07   63  


 


 09/17/20 06:00    22 H 


 


 09/17/20 04:32   67  22 H  97


 


 09/17/20 04:00  98.4 F   22 H 


 


 09/17/20 02:38   58 L  


 


 09/17/20 02:36   57 L  22 H  100


 


 09/17/20 02:00    22 H 


 


 09/17/20 00:34   89  22 H  99


 


 09/17/20 00:30   65  


 


 09/17/20 00:00  99.1 F   22 H 








                                     Weight











Admit Weight                   277 lb


 


Weight                         268 lb 15.423 oz











                            Most Recent Monitor Data











Heart Rate from ECG            73


 


NIBP                           132/52


 


NIBP BP-Mean                   78


 


Respiration from ECG           16


 


SpO2                           93














I&O: 


                                        











 09/16/20 09/17/20 09/18/20





 06:59 06:59 06:59


 


Intake Total 2407 3046.7 


 


Output Total 2365 1762 1485


 


Balance 42 1284.7 -1485











Result Diagrams: 


                                 09/16/20 04:15





                                 09/16/20 03:30


Additional Labs: 


I reviewed patient's labs and MAR


EKG Reviewed by me: Yes (Telemetry: Normal sinus rhythm)





Hospitalist ROS





- Review of Systems


ROS unobtainable: due to endotracheal tube





- Medication


Medications: 


Active Medications











Generic Name Dose Route Start Last Admin





  Trade Name Freq  PRN Reason Stop Dose Admin


 


Albuterol/Ipratropium  3 ml  09/15/20 07:03  09/15/20 12:44





  Duoneb  NEB   3 ml





  [id number] PRN   Administration





  SOB &/or Wheezing  


 


Albuterol/Ipratropium  3 ml  09/15/20 13:00  09/17/20 10:46





  Duoneb  NEB   3 ml





  [id number] LIDA   Administration


 


Aspirin  325 mg  09/15/20 09:00  09/17/20 08:25





  Aspirin  PER TUBE   325 mg





  DAILY LIDA   Administration


 


Enoxaparin Sodium  40 mg  09/15/20 09:00  09/17/20 08:24





  Lovenox  SC   40 mg





  0900 LIDA   Administration


 


Furosemide  40 mg  09/15/20 09:00  09/17/20 08:25





  Lasix  SLOW IVP   40 mg





  DAILY LIDA   Administration


 


Fentanyl Citrate 2,000 mcg/  100 mls @ 0 mls/hr  09/15/20 05:39  09/17/20 11:19





  Sodium Chloride  IV  10/15/20 05:39  100 mls





  INF LIDA   Administration





  Protocol  





  Per Protocol  


 


Ceftriaxone Sodium 1 gm/  100 mls @ 200 mls/hr  09/16/20 09:00  09/17/20 09:22





  Sodium Chloride  IVPB   100 mls





  Q24HR LIDA   Administration


 


Lorazepam  2 mg  09/15/20 05:39  09/16/20 09:49





  Ativan  SLOW IVP  10/15/20 05:39  2 mg





  Q1H PRN   Administration





  Breakthrough agitation  


 


Methylprednisolone Sodium Succinate  40 mg  09/15/20 09:00  09/17/20 08:28





  Solu-Medrol  IVP   40 mg





  Q6H LIDA   Administration


 


Montelukast Sodium  10 mg  09/17/20 09:00  09/17/20 08:25





  Montelukast Sodium 10 Mg Tablet  PER TUBE   10 mg





  DAILY LIDA   Administration


 


Pantoprazole Sodium  40 mg  09/15/20 09:00  09/17/20 08:25





  Protonix  IVP   40 mg





  DAILY LIDA   Administration


 


Propofol  1,000 mg  09/15/20 05:39  09/17/20 07:37





  Diprivan  IV  10/15/20 05:39  1,000 mg





  INF PRN   Administration





  TO ACHIEVE GOAL RASS  





  Protocol  


 


Vecuronium Bromide  10 mg  09/15/20 07:42  09/15/20 17:08





  Norcuron  IVP   10 mg





  Q30MIN PRN   Administration





  Agitation  














- Exam


General - other findings: Intubated, mechanically ventilated


ENT: normocephalic atraumatic


ENT - other findings: Endotracheal tube, OG tube


Neck: supple


Heart: RRR


Respiratory: CTAB


Gastrointestinal: soft, non-tender


Extremities: no cyanosis


Skin: no rashes


Neurological - other findings: Unable to assess


Psychiatric - other findings: Unable to assess





Hosp A/P





- Plan





(1) Acute respiratory failure with hypoxia and hypercapnia


Code(s): J96.01 - ACUTE RESPIRATORY FAILURE WITH HYPOXIA; J96.02 - ACUTE 

RESPIRATORY FAILURE WITH HYPERCAPNIA   Status: Acute   





(2) COPD exacerbation


Code(s): J44.1 - CHRONIC OBSTRUCTIVE PULMONARY DISEASE W (ACUTE) EXACERBATION   

Status: Acute   





(3) Acute exacerbation of CHF (congestive heart failure)


Code(s): I50.9 - HEART FAILURE, UNSPECIFIED   Status: Acute   


Qualifiers: 


   Heart failure type: combined systolic and diastolic   Qualified Code(s): 

I50.43 - Acute on chronic combined systolic (congestive) and diastolic 

(congestive) heart failure   





(4) Morbid obesity with BMI of 40.0-44.9, adult


Code(s): E66.01 - MORBID (SEVERE) OBESITY DUE TO EXCESS CALORIES; Z68.41 - BODY 

MASS INDEX (BMI) 40.0-44.9, ADULT   Status: Chronic   








(5) HTN (hypertension)


Code(s): I10 - ESSENTIAL (PRIMARY) HYPERTENSION   Status: Chronic   


Qualifiers: 


   Hypertension type: essential hypertension   Qualified Code(s): I10 - 

Essential (primary) hypertension   





- Plan





* Acute respiratory failure due to combination of COPD exacerbation and CHF 

  exacerbation


* Patient is on Rocephin, Azithromycin and Solumedrol IV


* Patient is on IV Lasix


* Blood glucose is stable


* Propofol for sedation


* GI and DVT Prophylaxis


* Continue tube feeds An angiography was performed of the distal infrarenal abdominal aorta  via power injection. Injection was performed with 14 mL contrast at 7 mL/s.